# Patient Record
Sex: FEMALE | Race: WHITE | HISPANIC OR LATINO | Employment: STUDENT | URBAN - METROPOLITAN AREA
[De-identification: names, ages, dates, MRNs, and addresses within clinical notes are randomized per-mention and may not be internally consistent; named-entity substitution may affect disease eponyms.]

---

## 2020-07-07 ENCOUNTER — LAB VISIT (OUTPATIENT)
Dept: PRIMARY CARE CLINIC | Facility: OTHER | Age: 25
End: 2020-07-07
Attending: INTERNAL MEDICINE
Payer: MEDICAID

## 2020-07-07 DIAGNOSIS — Z03.818 ENCOUNTER FOR OBSERVATION FOR SUSPECTED EXPOSURE TO OTHER BIOLOGICAL AGENTS RULED OUT: ICD-10-CM

## 2020-07-07 PROCEDURE — U0003 INFECTIOUS AGENT DETECTION BY NUCLEIC ACID (DNA OR RNA); SEVERE ACUTE RESPIRATORY SYNDROME CORONAVIRUS 2 (SARS-COV-2) (CORONAVIRUS DISEASE [COVID-19]), AMPLIFIED PROBE TECHNIQUE, MAKING USE OF HIGH THROUGHPUT TECHNOLOGIES AS DESCRIBED BY CMS-2020-01-R: HCPCS

## 2020-07-10 LAB — SARS-COV-2 RNA RESP QL NAA+PROBE: NEGATIVE

## 2024-04-29 ENCOUNTER — TELEPHONE (OUTPATIENT)
Dept: OBSTETRICS AND GYNECOLOGY | Facility: CLINIC | Age: 29
End: 2024-04-29
Payer: COMMERCIAL

## 2024-04-29 NOTE — TELEPHONE ENCOUNTER
----- Message from Francisca Eng sent at 4/26/2024  3:26 PM CDT -----  Contact: pt  Type:  Sooner Appointment Request    Caller is requesting a sooner appointment.  Caller declined first available appointment listed below.  Caller will not accept being placed on the waitlist and is requesting a message be sent to doctor.  Name of Caller:NP   When is the first available appointment?  Symptoms:Annual   Would the patient rather a call back or a response via [facility]? Call   Best Call Back Number:[phone]  Additional Information:

## 2024-04-29 NOTE — TELEPHONE ENCOUNTER
----- Message from Francisca Eng sent at 4/26/2024  3:26 PM CDT -----  Contact: pt  Type:  Sooner Appointment Request    Caller is requesting a sooner appointment.  Caller declined first available appointment listed below.  Caller will not accept being placed on the waitlist and is requesting a message be sent to doctor.  Name of Caller:NP   When is the first available appointment?  Symptoms:Annual   Would the patient rather a call back or a response via [facility]? Call   Best Call Back Number:[phone]  Additional Information:

## 2024-04-30 ENCOUNTER — TELEPHONE (OUTPATIENT)
Dept: OBSTETRICS AND GYNECOLOGY | Facility: CLINIC | Age: 29
End: 2024-04-30
Payer: COMMERCIAL

## 2024-04-30 NOTE — TELEPHONE ENCOUNTER
----- Message from Evelin Solares sent at 4/29/2024  4:23 PM CDT -----  Type:  Patient Returning Call    Who Called:pt  Who Left Message for Patient:Bon Lawrence  Does the patient know what this is regarding?:returning call  Would the patient rather a call back or a response via [facility]? call  Best Call Back Number: [phone]  Additional Information:

## 2024-04-30 NOTE — TELEPHONE ENCOUNTER
Spoke with pt. Informed pt that  only does fibroid visits. Got pt scheduled wit  on 6/18 at 10am. Pt verbalized understanding

## 2025-05-09 ENCOUNTER — HOSPITAL ENCOUNTER (OUTPATIENT)
Facility: HOSPITAL | Age: 30
Discharge: HOME | End: 2025-05-10
Attending: EMERGENCY MEDICINE | Admitting: INTERNAL MEDICINE
Payer: COMMERCIAL

## 2025-05-09 DIAGNOSIS — K85.10 ACUTE BILIARY PANCREATITIS WITHOUT INFECTION OR NECROSIS: Primary | ICD-10-CM

## 2025-05-09 DIAGNOSIS — K80.20 CALCULUS OF GALLBLADDER WITHOUT CHOLECYSTITIS WITHOUT OBSTRUCTION: ICD-10-CM

## 2025-05-09 DIAGNOSIS — K80.50 CHOLEDOCHOLITHIASIS: ICD-10-CM

## 2025-05-09 DIAGNOSIS — R10.10 UPPER ABDOMINAL PAIN: ICD-10-CM

## 2025-05-09 DIAGNOSIS — R10.9 ABDOMINAL PAIN: ICD-10-CM

## 2025-05-09 DIAGNOSIS — R07.9 CHEST PAIN: ICD-10-CM

## 2025-05-09 DIAGNOSIS — K85.90 ACUTE PANCREATITIS WITHOUT INFECTION OR NECROSIS, UNSPECIFIED PANCREATITIS TYPE: ICD-10-CM

## 2025-05-09 PROBLEM — K76.0 HEPATIC STEATOSIS: Status: ACTIVE | Noted: 2025-05-09

## 2025-05-09 PROBLEM — E28.2 PCOS (POLYCYSTIC OVARIAN SYNDROME): Status: ACTIVE | Noted: 2024-06-28

## 2025-05-09 PROBLEM — Z00.00 HEALTHCARE MAINTENANCE: Status: ACTIVE | Noted: 2025-01-09

## 2025-05-09 PROBLEM — E78.5 MILD HYPERLIPIDEMIA: Status: ACTIVE | Noted: 2025-01-09

## 2025-05-09 PROBLEM — N91.2 AMENORRHEA: Status: ACTIVE | Noted: 2024-06-28

## 2025-05-09 LAB
ABSOLUTE EOSINOPHIL (OHS): 0.09 K/UL
ABSOLUTE MONOCYTE (OHS): 0.6 K/UL (ref 0.3–1)
ABSOLUTE NEUTROPHIL COUNT (OHS): 5.93 K/UL (ref 1.8–7.7)
ALBUMIN SERPL BCP-MCNC: 3.7 G/DL (ref 3.5–5.2)
ALP SERPL-CCNC: 166 UNIT/L (ref 40–150)
ALT SERPL W/O P-5'-P-CCNC: 314 UNIT/L (ref 10–44)
ANION GAP (OHS): 12 MMOL/L (ref 8–16)
AST SERPL-CCNC: 343 UNIT/L (ref 11–45)
B-HCG UR QL: NEGATIVE
BACTERIA #/AREA URNS AUTO: ABNORMAL /HPF
BASOPHILS # BLD AUTO: 0.05 K/UL
BASOPHILS NFR BLD AUTO: 0.6 %
BILIRUB DIRECT SERPL-MCNC: 0.3 MG/DL (ref 0.1–0.3)
BILIRUB SERPL-MCNC: 1.5 MG/DL (ref 0.1–1)
BILIRUB UR QL STRIP.AUTO: NEGATIVE
BUN SERPL-MCNC: 8 MG/DL (ref 6–20)
CALCIUM SERPL-MCNC: 9.5 MG/DL (ref 8.7–10.5)
CHLORIDE SERPL-SCNC: 107 MMOL/L (ref 95–110)
CHOLEST SERPL-MCNC: 176 MG/DL (ref 120–199)
CHOLEST/HDLC SERPL: 3.8 {RATIO} (ref 2–5)
CLARITY UR: ABNORMAL
CO2 SERPL-SCNC: 19 MMOL/L (ref 23–29)
COLOR UR AUTO: YELLOW
CREAT SERPL-MCNC: 0.6 MG/DL (ref 0.5–1.4)
CTP QC/QA: YES
ERYTHROCYTE [DISTWIDTH] IN BLOOD BY AUTOMATED COUNT: 14.4 % (ref 11.5–14.5)
GFR SERPLBLD CREATININE-BSD FMLA CKD-EPI: >60 ML/MIN/1.73/M2
GGT SERPL-CCNC: 367 U/L (ref 8–55)
GLUCOSE SERPL-MCNC: 141 MG/DL (ref 70–110)
GLUCOSE UR QL STRIP: NEGATIVE
HCT VFR BLD AUTO: 38.4 % (ref 37–48.5)
HDLC SERPL-MCNC: 46 MG/DL (ref 40–75)
HDLC SERPL: 26.1 % (ref 20–50)
HGB BLD-MCNC: 13.2 GM/DL (ref 12–16)
HGB UR QL STRIP: NEGATIVE
HOLD SPECIMEN: NORMAL
IMM GRANULOCYTES # BLD AUTO: 0.03 K/UL (ref 0–0.04)
IMM GRANULOCYTES NFR BLD AUTO: 0.4 % (ref 0–0.5)
KETONES UR QL STRIP: NEGATIVE
LDLC SERPL CALC-MCNC: 86.6 MG/DL (ref 63–159)
LEUKOCYTE ESTERASE UR QL STRIP: ABNORMAL
LIPASE SERPL-CCNC: 498 U/L (ref 4–60)
LYMPHOCYTES # BLD AUTO: 1.77 K/UL (ref 1–4.8)
MCH RBC QN AUTO: 28.2 PG (ref 27–31)
MCHC RBC AUTO-ENTMCNC: 34.4 G/DL (ref 32–36)
MCV RBC AUTO: 82 FL (ref 82–98)
MICROSCOPIC COMMENT: ABNORMAL
NITRITE UR QL STRIP: NEGATIVE
NONHDLC SERPL-MCNC: 130 MG/DL
NUCLEATED RBC (/100WBC) (OHS): 0 /100 WBC
PH UR STRIP: 5 [PH]
PLATELET # BLD AUTO: 219 K/UL (ref 150–450)
PMV BLD AUTO: 11.2 FL (ref 9.2–12.9)
POTASSIUM SERPL-SCNC: 3.6 MMOL/L (ref 3.5–5.1)
PROT SERPL-MCNC: 7.7 GM/DL (ref 6–8.4)
PROT UR QL STRIP: NEGATIVE
RBC # BLD AUTO: 4.68 M/UL (ref 4–5.4)
RBC #/AREA URNS AUTO: 10 /HPF (ref 0–4)
RELATIVE EOSINOPHIL (OHS): 1.1 %
RELATIVE LYMPHOCYTE (OHS): 20.9 % (ref 18–48)
RELATIVE MONOCYTE (OHS): 7.1 % (ref 4–15)
RELATIVE NEUTROPHIL (OHS): 69.9 % (ref 38–73)
SODIUM SERPL-SCNC: 138 MMOL/L (ref 136–145)
SP GR UR STRIP: 1.01
SQUAMOUS #/AREA URNS AUTO: 7 /HPF
T4 FREE SERPL-MCNC: 1.02 NG/DL (ref 0.71–1.51)
T4 FREE SERPL-MCNC: 1.02 NG/DL (ref 0.71–1.51)
TRIGL SERPL-MCNC: 217 MG/DL (ref 30–150)
TRIGL SERPL-MCNC: 218 MG/DL (ref 30–150)
TSH SERPL-ACNC: 0.95 UIU/ML (ref 0.4–4)
UROBILINOGEN UR STRIP-ACNC: NEGATIVE EU/DL
WBC # BLD AUTO: 8.47 K/UL (ref 3.9–12.7)
WBC #/AREA URNS AUTO: 5 /HPF (ref 0–5)

## 2025-05-09 PROCEDURE — G0378 HOSPITAL OBSERVATION PER HR: HCPCS

## 2025-05-09 PROCEDURE — 84478 ASSAY OF TRIGLYCERIDES: CPT

## 2025-05-09 PROCEDURE — 81003 URINALYSIS AUTO W/O SCOPE: CPT | Performed by: NURSE PRACTITIONER

## 2025-05-09 PROCEDURE — 96361 HYDRATE IV INFUSION ADD-ON: CPT

## 2025-05-09 PROCEDURE — 85025 COMPLETE CBC W/AUTO DIFF WBC: CPT | Performed by: NURSE PRACTITIONER

## 2025-05-09 PROCEDURE — 96372 THER/PROPH/DIAG INJ SC/IM: CPT

## 2025-05-09 PROCEDURE — 81025 URINE PREGNANCY TEST: CPT | Performed by: NURSE PRACTITIONER

## 2025-05-09 PROCEDURE — 99285 EMERGENCY DEPT VISIT HI MDM: CPT | Mod: 25

## 2025-05-09 PROCEDURE — 63600175 PHARM REV CODE 636 W HCPCS: Mod: JZ,TB | Performed by: NURSE PRACTITIONER

## 2025-05-09 PROCEDURE — 82248 BILIRUBIN DIRECT: CPT

## 2025-05-09 PROCEDURE — 93005 ELECTROCARDIOGRAM TRACING: CPT

## 2025-05-09 PROCEDURE — 84443 ASSAY THYROID STIM HORMONE: CPT

## 2025-05-09 PROCEDURE — 96374 THER/PROPH/DIAG INJ IV PUSH: CPT

## 2025-05-09 PROCEDURE — 63600175 PHARM REV CODE 636 W HCPCS

## 2025-05-09 PROCEDURE — 83690 ASSAY OF LIPASE: CPT | Performed by: NURSE PRACTITIONER

## 2025-05-09 PROCEDURE — 82040 ASSAY OF SERUM ALBUMIN: CPT | Performed by: NURSE PRACTITIONER

## 2025-05-09 PROCEDURE — 82977 ASSAY OF GGT: CPT

## 2025-05-09 PROCEDURE — 84478 ASSAY OF TRIGLYCERIDES: CPT | Performed by: NURSE PRACTITIONER

## 2025-05-09 RX ORDER — METHOCARBAMOL 500 MG/1
500 TABLET, FILM COATED ORAL 3 TIMES DAILY
COMMUNITY
Start: 2024-10-23 | End: 2025-05-09

## 2025-05-09 RX ORDER — HYDROMORPHONE HYDROCHLORIDE 1 MG/ML
0.5 INJECTION, SOLUTION INTRAMUSCULAR; INTRAVENOUS; SUBCUTANEOUS EVERY 6 HOURS PRN
Status: DISCONTINUED | OUTPATIENT
Start: 2025-05-09 | End: 2025-05-10 | Stop reason: HOSPADM

## 2025-05-09 RX ORDER — SODIUM CHLORIDE 0.9 % (FLUSH) 0.9 %
10 SYRINGE (ML) INJECTION EVERY 12 HOURS PRN
Status: DISCONTINUED | OUTPATIENT
Start: 2025-05-09 | End: 2025-05-10 | Stop reason: HOSPADM

## 2025-05-09 RX ORDER — NALOXONE HCL 0.4 MG/ML
0.02 VIAL (ML) INJECTION
Status: DISCONTINUED | OUTPATIENT
Start: 2025-05-09 | End: 2025-05-10 | Stop reason: HOSPADM

## 2025-05-09 RX ORDER — IBUPROFEN 200 MG
24 TABLET ORAL
Status: DISCONTINUED | OUTPATIENT
Start: 2025-05-09 | End: 2025-05-10

## 2025-05-09 RX ORDER — ENOXAPARIN SODIUM 100 MG/ML
40 INJECTION SUBCUTANEOUS EVERY 24 HOURS
Status: DISCONTINUED | OUTPATIENT
Start: 2025-05-09 | End: 2025-05-10 | Stop reason: HOSPADM

## 2025-05-09 RX ORDER — GLUCAGON 1 MG
1 KIT INJECTION
Status: DISCONTINUED | OUTPATIENT
Start: 2025-05-09 | End: 2025-05-10

## 2025-05-09 RX ORDER — MEDROXYPROGESTERONE ACETATE 10 MG/1
10 TABLET ORAL
COMMUNITY
Start: 2024-06-17 | End: 2025-05-09

## 2025-05-09 RX ORDER — KETOROLAC TROMETHAMINE 30 MG/ML
15 INJECTION, SOLUTION INTRAMUSCULAR; INTRAVENOUS
Status: COMPLETED | OUTPATIENT
Start: 2025-05-09 | End: 2025-05-09

## 2025-05-09 RX ORDER — SODIUM CHLORIDE, SODIUM LACTATE, POTASSIUM CHLORIDE, CALCIUM CHLORIDE 600; 310; 30; 20 MG/100ML; MG/100ML; MG/100ML; MG/100ML
INJECTION, SOLUTION INTRAVENOUS CONTINUOUS
Status: ACTIVE | OUTPATIENT
Start: 2025-05-09 | End: 2025-05-10

## 2025-05-09 RX ORDER — ONDANSETRON HYDROCHLORIDE 2 MG/ML
4 INJECTION, SOLUTION INTRAVENOUS EVERY 6 HOURS PRN
Status: DISCONTINUED | OUTPATIENT
Start: 2025-05-09 | End: 2025-05-10 | Stop reason: HOSPADM

## 2025-05-09 RX ORDER — NORGESTIMATE AND ETHINYL ESTRADIOL 0.25-0.035
1 KIT ORAL DAILY
COMMUNITY

## 2025-05-09 RX ADMIN — ENOXAPARIN SODIUM 40 MG: 40 INJECTION SUBCUTANEOUS at 06:05

## 2025-05-09 RX ADMIN — KETOROLAC TROMETHAMINE 15 MG: 30 INJECTION, SOLUTION INTRAMUSCULAR; INTRAVENOUS at 12:05

## 2025-05-09 RX ADMIN — SODIUM CHLORIDE, POTASSIUM CHLORIDE, SODIUM LACTATE AND CALCIUM CHLORIDE: 600; 310; 30; 20 INJECTION, SOLUTION INTRAVENOUS at 10:05

## 2025-05-09 NOTE — MEDICAL/APP STUDENT
[facility] Medicine H&P Note     Admitting Team: [facility] Hospitalist Team B  Attending Physician: Keyshawn Anaya MD  Medical Student: Natalya Gutierrez    Date of Admit: 5/9/2025    Chief Complaint     Back Pain (Patient reports lumbar back pain x2 days. She reports nausea yesterday. Patient denies any trauma/injury to back. She denies any pain or burning upon urination. Patient reports taking muscle relaxer and Advil for pain with little relief. Patient reports hx of back pain. )  RUQ and mid back pain    Subjective:      History of Present Illness:  Blanca Acosta is a 29 y.o. female with a PMHx of PCOS and mild hyperlipidemia. The patient presented on 5/9/2025 for evaluation of 2 days of recurrent mid back pain and 1 day of RUQ/epigastric pain. States a stabbing mid back pain awoke her from sleep 2 nights ago and self resolved within a few minutes. Reports onset of nausea after lunch yesterday for few hours, later accompanied by a new, dull, pressure-like pain in the RUQ/epigastric region lasting about 30 minutes. Patient states the abdominal pain was mildly relieved by an antacid and Sprite before self-resolving. Reports decreased appetite and recurrence of the abdominal pain this morning immediately after breakfast (toast and eggs) until she arrived at the ED around 10:30. She says yesterday was the first time she has had the abdominal pain, but that she has been having the stabbing back pain every few weeks since July 2024. States she previously saw a PCP and an orthopedist for it, both of whom were treating her for muscular pain due to negative x-rays. States the orthopedist gave her a muscle relaxant for the pain, which helped moderately the first few times she took it before it stopped working, and reports NSAIDs failed to control the pain. She says her fingers get cold and her mother tells her she looks pale during these episodes.  Reports diet rich in rice, beans, and cheese, with minimal fried food, and denies  association of pain with any specific food. Denies fever, chills, constipation, diarrhea, dysuria, and vomiting.      Past Medical History:  History reviewed. No pertinent past medical history.  Polycystic Ovarian Disease  Mild Hyperlipidemia    Past Surgical History:  History reviewed. No pertinent surgical history.  None.    Social History:  Tobacco: Denies  Alcohol: every other week, minimal intake  Drugs: Denies    Family History:  No family history on file.  Mother: DM, HTN, cholecystectomy  Father: HTN, cholecystectomy    Home Medications:  Estarylla (ethinyl estradiol/norgestimate)    Allergies:  Review of patient's allergies indicates:  No Known Allergies    Review of Systems:  Review of Systems   Constitutional:  Negative for chills and fever.   Respiratory:  Positive for shortness of breath (during episodes of back pain).    Cardiovascular:  Negative for chest pain.   Gastrointestinal:  Positive for abdominal pain (RUQ/epigastric) and nausea. Negative for constipation, diarrhea, heartburn and vomiting.   Genitourinary:  Negative for dysuria.   Neurological:  Negative for headaches.       Health Care Maintenance :   Primary Care Physician: Mick العلي DO     Immunizations:   Immunization History   Administered Date(s) Administered    COVID-19, MRNA, LN-S, PF (Pfizer) (Purple Cap) 01/20/2021, 02/10/2021, 12/14/2021    DTaP 02/12/1996, 04/11/1996, 06/14/1996, 06/17/1997, 08/10/2000    HIB 02/12/1996, 04/11/1996, 06/14/1996, 03/11/1997    HPV Quadrivalent 09/10/2007, 11/16/2007, 03/21/2008    Hepatitis A, Pediatric/Adolescent, 2 Dose 08/28/1998    Hepatitis B, Pediatric/Adolescent 1995, 02/12/1996, 06/14/1996    IPV 02/12/1996, 04/11/1996, 06/14/1996, 08/10/2000    Influenza - Intranasal 11/20/2012    Influenza - Trivalent (PED) 11/16/2007, 11/17/2009, 11/09/2010    Influenza - Trivalent - Afluria, Fluzone MDV 10/18/2014, 09/30/2017    MMR 03/11/1997, 05/13/2000    Meningococcal Conjugate  "(MCV4O) 1 Vial Dose(10yr-55yr) 2012    Meningococcal Conjugate (MCV4P) 09/10/2007    Td (ADULT) 2018    Td - PF (ADULT) 2018    Tdap 09/10/2007    Varicella 1996, 09/10/2007        Cancer Screening:  PAP: ***  MMG: N/A  Colonoscopy: N/A    Objective:   Last 24 Hour Vital Signs:  BP  Min: 147/91  Max: 147/91  Temp  Av.2 °F (36.8 °C)  Min: 98.2 °F (36.8 °C)  Max: 98.2 °F (36.8 °C)  Pulse  Av  Min: 76  Max: 76  Resp  Av  Min: 16  Max: 16  SpO2  Av %  Min: 99 %  Max: 99 %  Height  Av' 2" (157.5 cm)  Min: 5' 2" (157.5 cm)  Max: 5' 2" (157.5 cm)  Weight  Av.2 kg (190 lb)  Min: 86.2 kg (190 lb)  Max: 86.2 kg (190 lb)  Body mass index is 34.75 kg/m².  No intake/output data recorded.    Physical Examination:  Physical Exam  Vitals and nursing note reviewed.   Constitutional:       General: She is not in acute distress.     Appearance: She is not ill-appearing.   HENT:      Head: Normocephalic and atraumatic.      Nose: Nose normal.      Mouth/Throat:      Mouth: Mucous membranes are moist.      Pharynx: Oropharynx is clear. No posterior oropharyngeal erythema.   Eyes:      General: No scleral icterus.     Extraocular Movements: Extraocular movements intact.      Conjunctiva/sclera: Conjunctivae normal.   Cardiovascular:      Rate and Rhythm: Normal rate and regular rhythm.      Heart sounds: Normal heart sounds.   Pulmonary:      Effort: Pulmonary effort is normal.      Breath sounds: Normal breath sounds. No wheezing or rales.   Chest:      Chest wall: No tenderness.   Abdominal:      General: Abdomen is flat. There is no distension.      Palpations: Abdomen is soft. There is no mass.      Tenderness: There is abdominal tenderness (RUQ). There is no guarding or rebound.   Musculoskeletal:      Thoracic back: Normal.      Lumbar back: Tenderness (R mid back) present.   Skin:     General: Skin is warm.      Capillary Refill: Capillary refill takes less than 2 seconds.      " "Coloration: Skin is not jaundiced or pale.      Findings: No erythema.   Neurological:      Mental Status: She is alert and oriented to person, place, and time.   Psychiatric:         Mood and Affect: Mood normal.         Behavior: Behavior normal.          Laboratory:  Most Recent Data:  CBC:   Lab Results   Component Value Date    WBC 8.47 05/09/2025    HGB 13.2 05/09/2025    HCT 38.4 05/09/2025     05/09/2025    MCV 82 05/09/2025    RDW 14.4 05/09/2025     WBC Differential: 69.9 % N, 0.4 % Bands, 1.77 % L, 7.1 % M, 0.09 % Eo, 0.05 % Baso      BMP:   Lab Results   Component Value Date     05/09/2025    K 3.6 05/09/2025     05/09/2025    CO2 19 (L) 05/09/2025    BUN 8 05/09/2025    CREATININE 0.6 05/09/2025     (H) 05/09/2025    CALCIUM 9.5 05/09/2025     LFTs:   Lab Results   Component Value Date    PROT 7.7 05/09/2025    ALBUMIN 3.7 05/09/2025    BILITOT 1.5 (H) 05/09/2025     (H) 05/09/2025    ALKPHOS 166 (H) 05/09/2025     (H) 05/09/2025         Coags: No results found for: "INR", "PROTIME", "PTT"  FLP:   Lab Results   Component Value Date    TRIG 218 (H) 05/09/2025     DM:   Lab Results   Component Value Date    HGBA1C 5.6 06/17/2024    GLUF 84 01/08/2010    CREATININE 0.6 05/09/2025     Thyroid:   Lab Results   Component Value Date    TSH 2.57 01/08/2010     Anemia: No results found for: "IRON", "TIBC", "FERRITIN", "IDSACYPJ92", "FOLATE"  Cardiac: No results found for: "TROPONINI", "CKTOTAL", "CKMB", "BNP"  Urinalysis:   Lab Results   Component Value Date    COLORU Yellow 05/09/2025    SPECGRAV 1.015 05/09/2025    NITRITE Negative 05/09/2025    KETONESU NEG 08/07/2012    UROBILINOGEN Negative 05/09/2025    WBCUA 5 05/09/2025       Microbiology Data:   N/A    Radiology:  Imaging Results              X-Ray Chest 1 View (Final result)  Result time 05/09/25 18:00:28      Final result by Aristides Allison DO (05/09/25 18:00:28)                   Impression:      No acute " abnormality.      Electronically signed by: Aristides Allison  Date:    05/09/2025  Time:    18:00               Narrative:    EXAMINATION:  XR CHEST 1 VIEW    CLINICAL HISTORY:  abdominal pain;    TECHNIQUE:  Single frontal view of the chest was performed.    COMPARISON:  None    FINDINGS:  The lungs are well expanded and clear. No focal opacities are seen. The pleural spaces are clear. The cardiac silhouette is unremarkable. The visualized osseous structures are unremarkable.                                       MRI MRCP Abdomen WO Contrast 3D WO Independent WS XPD (Final result)  Result time 05/09/25 17:28:27      Final result by Aristides Allison DO (05/09/25 17:28:27)                   Impression:      Acute, interstitial pancreatitis, recommend correlation with lipase.  No peripancreatic fluid collection.    Cholelithiasis without evidence of acute cholecystitis.      Electronically signed by: Aristides Allison  Date:    05/09/2025  Time:    17:28               Narrative:    EXAMINATION:  MRI MRCP ABDOMEN WO CONTRAST 3D WO INDEPENDENT WS XPD    CLINICAL HISTORY:  Cholelithiasis;    TECHNIQUE:  Multiplanar, multisequence magnetic resonance images of the liver and upper abdomen without intravenous contrast. Additionally 2D and 3D MRCP sequences are performed as well as MIP images.    COMPARISON:  Ultrasound from 05/09/2025.    FINDINGS:  Pulmonary Bases: No gross abnormality.    Liver: The liver is normal in size and demonstrates normal signal.  No evidence of fat or iron deposition.  No focal lesion.    Bile Ducts: No intra or extrahepatic biliary ductal dilation.  There is no evidence of a biliary filling defect.    Gallbladder: There are gallstones.  There is no gallbladder wall thickening or pericholecystic fluid or inflammatory change.    Pancreas: There is minimal pancreatic and peripancreatic edema, compatible with acute, interstitial pancreatitis.  There is no peripancreatic fluid collection.  There is no  pancreatic ductal dilation.    Spleen: Normal size without focal lesion.    Gastrointestinal tract: No bowel wall thickening or obstruction.    Mesentery and retroperitoneum: No ascites, focal fluid collection, or free air.    Lymph nodes: No evidence of lymphadenopathy.    Adrenal Glands: Unremarkable.    Kidneys: Normal without focal lesion or hydronephrosis.    Aorta and Abdominal Vasculature: No aneurysm.    Body wall: Unremarkable.    Musculoskeletal: Normal marrow signal.                                       US Abdomen Limited (Gallbladder) (Final result)  Result time 05/09/25 12:17:59      Final result by Ryan Lundy MD (05/09/25 12:17:59)                   Impression:      Cholelithiasis/sludge, no secondary findings to suggest acute cholecystitis.    Hepatic parenchymal findings may reflect steatosis or other infiltrative process, correlation with LFTs advised.      Electronically signed by: Ryan uLndy MD  Date:    05/09/2025  Time:    12:17               Narrative:    EXAMINATION:  US ABDOMEN LIMITED    CLINICAL HISTORY:  Upper abdominal pain, unspecified    TECHNIQUE:  Limited ultrasound of the right upper quadrant of the abdomen (including pancreas, liver, gallbladder, common bile duct, and spleen) was performed.    COMPARISON:  None.    FINDINGS:  The visualized portions of the pancreas are unremarkable.  There is cholelithiasis/sludge.  The gallbladder is relatively decompressed.  No gallbladder wall thickening or pericholecystic fluid.  No gallbladder wall hyperemia.  Sonographic Morocho sign is negative.  The common duct is not dilated measuring 4 mm.  The a [place] parenchyma is heterogeneous and somewhat echogenic.  The visualized portions of the right kidney are unremarkable.  No ascites.                                        Other Results:  EKG (my interpretation): n/a      Assessment:     Blanca Acosta is a 29 y.o. female with a PMHx of PCOS and hyperlipidemia presenting with 2  days of acute back pain and 1 day of acute RUQ pain associated with nausea.  Given recurrent stabbing back pain that resolves in minutes, unaffected by NSAIDs, with orthopedics and PCP previously ruling out a skeletal source, patient likely has chronic cholelithiasis. Now, with acute presentation per clinical presentation, ultrasound, elevated LFTs, and elevated lipase.     Plan:     Acute on Chronic Cholelithiasis  - U/S with cholelithiasis and sludge; common bile duct not dilated  - Elevated LFTs: , , , total bilirubin 1.5, Gamma Glutamyl Transferase 367      Hypertriglyceridemia  - Triglycerides: 218  - Elevated Lipase: 498      PPx: ***  Diet: ***  Code: ***    Disposition: ***    Natalya Gutierrez MS-3  [employer] [facility] [employer]      [facility] Medicine Hospitalist Pager numbers:   [facility] Hospitalist Medicine Team A (Fabricio/August): [phone]  [facility] Hospitalist Medicine Team B (Jaclyn/Shawn):  [phone]

## 2025-05-09 NOTE — PHARMACY MED REC
"      [facility]           Pharmacy  Admission Medication History     The home medication history was taken by Lorena Vigil.      Medication history obtained from Medications listed below were obtained from: Patient/family.    Based on information gathered for medication list, you may go to "Admission" then "Reconcile Home Medications" tabs to review and/or act upon those items.     The home medication list has been updated by the Pharmacy department.   Please read ALL comments highlighted in yellow.   Please address this information as you see fit.    Feel free to contact us if you have any questions or require assistance.    The medications listed below were removed from the home medication list.  Please reorder if appropriate:    Patient reports NOT TAKING the following medication(s):  Methocarbamol 500 mg tab  Medroxyprogesterone 10 mg tab    No current facility-administered medications on file prior to encounter.     Current Outpatient Medications on File Prior to Encounter   Medication Sig Dispense Refill    ESTARYLLA 0.25-0.035 mg per tablet Take 1 tablet by mouth once daily.         Please address this information as you see fit.  Feel free to contact us if you have any questions or require assistance.    Lorena Vigil  [phone]            .          "

## 2025-05-09 NOTE — PLAN OF CARE
VIRTUAL NURSE: Permission received per patient to turn camera to view patient. VIP model explained; patient informed this VN will be working with bedside nurse and the rest of the care team. Plan of care reviewed with patient.  Educated patient on VTE, fall risk and fall risk precautions in place. Call light within reach, side rails up x2. Admission questions completed. Patient educated on fall precautions. Patient verbalized understanding

## 2025-05-09 NOTE — ED PROVIDER NOTES
Encounter Date: 5/9/2025       History     Chief Complaint   Patient presents with    Back Pain     Patient reports lumbar back pain x2 days. She reports nausea yesterday. Patient denies any trauma/injury to back. She denies any pain or burning upon urination. Patient reports taking muscle relaxer and Advil for pain with little relief. Patient reports hx of back pain.      29 yr old female presents to the ER with reports of mid back pain. Pt states she has been seen per ortho and others with no answer to pain. After further eval, pt states she has been having nausea, especially after eating, and right upper abd pain. Pt states she has been having this pain for over a year on and off. Denies vomiting or diarrhea. No dysuria. No chest pain or sob. PMH of PCOS.     The history is provided by a relative and the patient. No  was used.     Review of patient's allergies indicates:  No Known Allergies  History reviewed. No pertinent past medical history.  History reviewed. No pertinent surgical history.  No family history on file.  Social History[1]  Review of Systems   Gastrointestinal:  Positive for abdominal pain and nausea.       Physical Exam     Initial Vitals [05/09/25 1045]   BP Pulse Resp Temp SpO2   (!) 147/91 76 16 98.2 °F (36.8 °C) 99 %      MAP       --         Physical Exam    Constitutional: She appears well-developed and well-nourished.   HENT:   Head: Normocephalic.   Right Ear: Hearing normal.   Left Ear: Hearing normal.   Nose: Nose normal. Mouth/Throat: Oropharynx is clear and moist.   Eyes: Lids are normal.   Neck:   Normal range of motion.  Cardiovascular:  Normal rate.           Pulmonary/Chest: Breath sounds normal. No respiratory distress. She has no wheezes. She has no rhonchi.   Abdominal: Abdomen is soft. There is abdominal tenderness in the right upper quadrant. There is positive Morocho's sign.   Musculoskeletal:         General: Normal range of motion.      Cervical back:  Normal range of motion. No rigidity.     Neurological: She is alert and oriented to person, place, and time.   Skin: Skin is warm and dry. No rash noted.   Psychiatric: She has a normal mood and affect. Her behavior is normal. Judgment and thought content normal.         ED Course   Procedures  Labs Reviewed   COMPREHENSIVE METABOLIC PANEL - Abnormal       Result Value    Sodium 138      Potassium 3.6      Chloride 107      CO2 19 (*)     Glucose 141 (*)     BUN 8      Creatinine 0.6      Calcium 9.5      Protein Total 7.7      Albumin 3.7      Bilirubin Total 1.5 (*)      (*)      (*)      (*)     Anion Gap 12      eGFR >60     LIPASE - Abnormal    Lipase Level 498 (*)    URINALYSIS, REFLEX TO URINE CULTURE - Abnormal    Color, UA Yellow      Appearance, UA Hazy (*)     pH, UA 5.0      Spec Grav UA 1.015      Protein, UA Negative      Glucose, UA Negative      Ketones, UA Negative      Bilirubin, UA Negative      Blood, UA Negative      Nitrites, UA Negative      Urobilinogen, UA Negative      Leukocyte Esterase, UA 2+ (*)    URINALYSIS MICROSCOPIC - Abnormal    RBC, UA 10 (*)     WBC, UA 5      Bacteria, UA Rare      Squamous Epithelial Cells, UA 7      Microscopic Comment       CBC WITH DIFFERENTIAL - Normal    WBC 8.47      RBC 4.68      HGB 13.2      HCT 38.4      MCV 82      MCH 28.2      MCHC 34.4      RDW 14.4      Platelet Count 219      MPV 11.2      Nucleated RBC 0      Neut % 69.9      Lymph % 20.9      Mono % 7.1      Eos % 1.1      Basophil % 0.6      Imm Grans % 0.4      Neut # 5.93      Lymph # 1.77      Mono # 0.60      Eos # 0.09      Baso # 0.05      Imm Grans # 0.03     CBC W/ AUTO DIFFERENTIAL    Narrative:     The following orders were created for panel order CBC auto differential.  Procedure                               Abnormality         Status                     ---------                               -----------         ------                     CBC with  Differential[[id number]]         Normal              Final result                 Please view results for these tests on the individual orders.   EXTRA TUBES    Narrative:     The following orders were created for panel order EXTRA TUBES.  Procedure                               Abnormality         Status                     ---------                               -----------         ------                     Light Green Top Hold[[id number]]                              Final result                 Please view results for these tests on the individual orders.   LIGHT GREEN TOP HOLD    Extra Tube Hold for add-ons.     GREY TOP URINE HOLD    Extra Tube Hold for add-ons.     TRIGLYCERIDES   POCT URINE PREGNANCY    POC Preg Test, Ur Negative       Acceptable Yes            Imaging Results              US Abdomen Limited (Gallbladder) (Final result)  Result time 05/09/25 12:17:59      Final result by Ryan Lundy MD (05/09/25 12:17:59)                   Impression:      Cholelithiasis/sludge, no secondary findings to suggest acute cholecystitis.    Hepatic parenchymal findings may reflect steatosis or other infiltrative process, correlation with LFTs advised.      Electronically signed by: Ryan Lundy MD  Date:    05/09/2025  Time:    12:17               Narrative:    EXAMINATION:  US ABDOMEN LIMITED    CLINICAL HISTORY:  Upper abdominal pain, unspecified    TECHNIQUE:  Limited ultrasound of the right upper quadrant of the abdomen (including pancreas, liver, gallbladder, common bile duct, and spleen) was performed.    COMPARISON:  None.    FINDINGS:  The visualized portions of the pancreas are unremarkable.  There is cholelithiasis/sludge.  The gallbladder is relatively decompressed.  No gallbladder wall thickening or pericholecystic fluid.  No gallbladder wall hyperemia.  Sonographic Morocho sign is negative.  The common duct is not dilated measuring 4 mm.  The a Paddock parenchyma is heterogeneous  and somewhat echogenic.  The visualized portions of the right kidney are unremarkable.  No ascites.                                       Medications   ketorolac injection 15 mg (15 mg Intravenous Given 5/9/25 1213)     Medical Decision Making  Differential Diagnosis includes, but is not limited to:  perforated viscous, SBO/volvulus, incarcerated/strangulated hernia, intussusception, ileus, appendicitis, cholecystitis, cholangitis, diverticulitis, esophagitis, hepatitis, nephrolithiasis, pancreatitis, gastroenteritis, colitis, IBD/IBS, biliary colic, GERD, PUD, constipation, UTI/pyelonephritis,  disorder.       Amount and/or Complexity of Data Reviewed  Labs: ordered. Decision-making details documented in ED Course.  Radiology: ordered.    Risk  Prescription drug management.  Decision regarding hospitalization.               ED Course as of 05/09/25 1540   Fri May 09, 2025   1153 POCT urine pregnancy [DT]   1235 FINDINGS:  The visualized portions of the pancreas are unremarkable.  There is cholelithiasis/sludge.  The gallbladder is relatively decompressed.  No gallbladder wall thickening or pericholecystic fluid.  No gallbladder wall hyperemia.  Sonographic Morocho sign is negative.  The common duct is not dilated measuring 4 mm.  The a Paddock parenchyma is heterogeneous and somewhat echogenic.  The visualized portions of the right kidney are unremarkable.  No ascites.     Impression:     Cholelithiasis/sludge, no secondary findings to suggest acute cholecystitis.     Hepatic parenchymal findings may reflect steatosis or other infiltrative process, correlation with LFTs advised.   [DT]   1256 Urinalysis, Reflex to Urine Culture Urine, Clean Catch(!) [DT]   1256 Urinalysis Microscopic(!) [DT]   1256 CBC auto differential [DT]   1435 Comprehensive metabolic panel(!) [DT]   1436 On the line with [facility]. States to add the MRCP without and she will see patient today.  [KG]   1441 On the line with [facility] concerning  need for admission. Accepts with no other recs.  [KG]   1455 Lipase(!) [DT]   1512 PT notified to not eat or drink anything till MRCP or cleared per GI. Reviewed ultrasound and all labs.  [DT]      ED Course User Index  [DT] Evelina Woodard, ALEXANDER  [KG] Justa Trevino PA-C                           Clinical Impression:  Final diagnoses:  [R10.10] Upper abdominal pain  [K80.50] Choledocholithiasis (Primary)          ED Disposition Condition    Observation                   [1]   Social History  Tobacco Use    Smoking status: Unknown        Evelina Woodard NP  05/09/25 0456

## 2025-05-09 NOTE — ED NOTES
"Pt reports mid to upper back pain that started 2 nights ago. Pt states that pain was sharp and woke her from sleep and pain lasted only a "few minutes". Pt states that she has had lingering pain since that time. Pt states that after eating today, she began with sharp pain again while at work and left work due to pain. Pt states she suffered with nausea all day yesterday. Pt also complaining of RUQ discomfort.  "

## 2025-05-09 NOTE — ED NOTES
Received call from [facility]. Answered questions and reports they will send for her in 20 minutes.

## 2025-05-10 VITALS
SYSTOLIC BLOOD PRESSURE: 133 MMHG | DIASTOLIC BLOOD PRESSURE: 83 MMHG | TEMPERATURE: 98 F | HEART RATE: 59 BPM | WEIGHT: 190.94 LBS | BODY MASS INDEX: 35.14 KG/M2 | HEIGHT: 62 IN | RESPIRATION RATE: 16 BRPM | OXYGEN SATURATION: 99 %

## 2025-05-10 PROBLEM — K85.10 ACUTE BILIARY PANCREATITIS WITHOUT INFECTION OR NECROSIS: Status: ACTIVE | Noted: 2025-05-10

## 2025-05-10 PROBLEM — K85.10 ACUTE BILIARY PANCREATITIS WITHOUT INFECTION OR NECROSIS: Status: RESOLVED | Noted: 2025-05-10 | Resolved: 2025-05-10

## 2025-05-10 LAB
ALBUMIN SERPL BCP-MCNC: 3.4 G/DL (ref 3.5–5.2)
ALP SERPL-CCNC: 138 UNIT/L (ref 40–150)
ALT SERPL W/O P-5'-P-CCNC: 226 UNIT/L (ref 10–44)
ANION GAP (OHS): 8 MMOL/L (ref 8–16)
AST SERPL-CCNC: 124 UNIT/L (ref 11–45)
BILIRUB SERPL-MCNC: 1 MG/DL (ref 0.1–1)
BUN SERPL-MCNC: 9 MG/DL (ref 6–20)
CALCIUM SERPL-MCNC: 9.4 MG/DL (ref 8.7–10.5)
CHLORIDE SERPL-SCNC: 110 MMOL/L (ref 95–110)
CO2 SERPL-SCNC: 24 MMOL/L (ref 23–29)
CREAT SERPL-MCNC: 0.6 MG/DL (ref 0.5–1.4)
EAG (OHS): 108 MG/DL (ref 68–131)
GFR SERPLBLD CREATININE-BSD FMLA CKD-EPI: >60 ML/MIN/1.73/M2
GLUCOSE SERPL-MCNC: 79 MG/DL (ref 70–110)
HBA1C MFR BLD: 5.4 % (ref 4–5.6)
MAGNESIUM SERPL-MCNC: 1.9 MG/DL (ref 1.6–2.6)
PHOSPHATE SERPL-MCNC: 4 MG/DL (ref 2.7–4.5)
POTASSIUM SERPL-SCNC: 4.2 MMOL/L (ref 3.5–5.1)
PROT SERPL-MCNC: 7.1 GM/DL (ref 6–8.4)
SODIUM SERPL-SCNC: 142 MMOL/L (ref 136–145)

## 2025-05-10 PROCEDURE — 84100 ASSAY OF PHOSPHORUS: CPT

## 2025-05-10 PROCEDURE — 80053 COMPREHEN METABOLIC PANEL: CPT

## 2025-05-10 PROCEDURE — 96361 HYDRATE IV INFUSION ADD-ON: CPT

## 2025-05-10 PROCEDURE — G0378 HOSPITAL OBSERVATION PER HR: HCPCS

## 2025-05-10 PROCEDURE — 83735 ASSAY OF MAGNESIUM: CPT

## 2025-05-10 PROCEDURE — 83036 HEMOGLOBIN GLYCOSYLATED A1C: CPT

## 2025-05-10 PROCEDURE — 36415 COLL VENOUS BLD VENIPUNCTURE: CPT

## 2025-05-10 RX ORDER — ONDANSETRON 4 MG/1
4 TABLET, ORALLY DISINTEGRATING ORAL EVERY 8 HOURS PRN
Qty: 7 TABLET | Refills: 0 | Status: SHIPPED | OUTPATIENT
Start: 2025-05-10 | End: 2025-05-17

## 2025-05-10 NOTE — H&P
[facility] Medicine H&P Note     Admitting Team: [facility] Hospitalist Team B  Attending Physician: Keyshawn Anaya MD  Resident: Chas Howard MD  Intern: Ryan Hassan DO     Date of Admit: 5/9/2025    Chief Complaint     Back pain for 2 days     Subjective:      History of Present Illness:  Blanca Acosta is a 29 y.o. female with a PMHx of PCOS and mild hyperlipidemia. The patient presented on 5/9/2025 for evaluation of 2 days of recurrent mid back pain and 1 day of RUQ/epigastric pain. States a stabbing mid back pain awoke her from sleep 2 nights ago and self resolved within a few minutes. Reports onset of nausea after lunch yesterday for few hours, later accompanied by a new, dull, pressure-like pain in the RUQ/epigastric region lasting about 30 minutes. Patient states the abdominal pain was mildly relieved by an antacid and Sprite before self-resolving. Reports decreased appetite and recurrence of the abdominal pain this morning immediately after breakfast (toast and eggs) until she arrived at the ED around 10:30. She says yesterday was the first time she has had the abdominal pain, but that she has been having the stabbing back pain every few weeks since July 2024. States she previously saw a PCP and an orthopedist for it, both of whom were treating her for muscular pain due to negative x-rays. States the orthopedist gave her a muscle relaxant for the pain, which helped moderately the first few times she took it before it stopped working, and reports NSAIDs failed to control the pain. She says her fingers get cold and her mother tells her she looks pale during these episodes.  Reports diet rich in rice, beans, and cheese, with minimal fried food, and denies association of pain with any specific food. Denies fever, chills, constipation, diarrhea, dysuria, and vomiting.    Past Medical History:  - PCOS  - HLD    Past Surgical History:  - No prior surgery     Social History:  Tobacco: None  Alcohol: Occasional  "  Drugs: None    Family History:  - No pertinent family history     Home Medications:  - Estarylla (ethinyl estradiol/norgestimate)     Allergies:  Review of patient's allergies indicates:  No Known Allergies    Review of Systems:  Review of Systems   Gastrointestinal:  Positive for abdominal pain and nausea.   Musculoskeletal:  Positive for back pain.   All other systems reviewed and are negative.      Health Care Maintenance :   Primary Care Physician: Mick العلي DO     Immunizations:   Immunization History   Administered Date(s) Administered    COVID-19, MRNA, LN-S, PF (Pfizer) (Purple Cap) 2021, 02/10/2021, 2021    DTaP 1996, 1996, 1996, 1997, 08/10/2000    HIB 1996, 1996, 1996, 1997    HPV Quadrivalent 09/10/2007, 2007, 2008    Hepatitis A, Pediatric/Adolescent, 2 Dose 1998    Hepatitis B, Pediatric/Adolescent 1995, 1996, 1996    IPV 1996, 1996, 1996, 08/10/2000    Influenza - Intranasal 2012    Influenza - Trivalent (PED) 2007, 2009, 2010    Influenza - Trivalent - Afluria, Fluzone MDV 10/18/2014, 2017    MMR 1997, 2000    Meningococcal Conjugate (MCV4O) 1 Vial Dose(10yr-55yr) 2012    Meningococcal Conjugate (MCV4P) 09/10/2007    Td (ADULT) 2018    Td - PF (ADULT) 2018    Tdap 09/10/2007    Varicella 1996, 09/10/2007       Objective:   Last 24 Hour Vital Signs:  BP  Min: 134/83  Max: 147/91  Temp  Av.4 °F (36.9 °C)  Min: 98.2 °F (36.8 °C)  Max: 98.5 °F (36.9 °C)  Pulse  Av.7  Min: 62  Max: 76  Resp  Av  Min: 16  Max: 16  SpO2  Av.7 %  Min: 98 %  Max: 99 %  Height  Av' 2" (157.5 cm)  Min: 5' 2" (157.5 cm)  Max: 5' 2" (157.5 cm)  Weight  Av.4 kg (190 lb 7.3 oz)  Min: 86.2 kg (190 lb)  Max: 86.6 kg (190 lb 14.7 oz)  Body mass index is 34.92 kg/m².  No intake/output data recorded.    Physical " "Examination:  Physical Exam  Vitals reviewed.   Constitutional:       General: She is not in acute distress.     Appearance: She is not ill-appearing.   HENT:      Head: Normocephalic and atraumatic.   Cardiovascular:      Rate and Rhythm: Normal rate and regular rhythm.      Heart sounds: No murmur heard.  Pulmonary:      Effort: Pulmonary effort is normal.      Breath sounds: No wheezing, rhonchi or rales.   Abdominal:      General: Abdomen is flat. There is no distension.      Palpations: Abdomen is soft.      Tenderness: There is no guarding or rebound.      Comments: Mild RUQ tenderness   Musculoskeletal:      Right lower leg: No edema.      Left lower leg: No edema.   Skin:     General: Skin is warm and dry.      Coloration: Skin is not jaundiced.   Neurological:      General: No focal deficit present.      Mental Status: She is alert and oriented to person, place, and time.   Psychiatric:         Mood and Affect: Mood normal.         Behavior: Behavior normal.        Laboratory:  Most Recent Data:  CBC:   Lab Results   Component Value Date    WBC 8.47 05/09/2025    HGB 13.2 05/09/2025    HCT 38.4 05/09/2025     05/09/2025    MCV 82 05/09/2025    RDW 14.4 05/09/2025     BMP:   Lab Results   Component Value Date     05/09/2025    K 3.6 05/09/2025     05/09/2025    CO2 19 (L) 05/09/2025    BUN 8 05/09/2025    CREATININE 0.6 05/09/2025     (H) 05/09/2025    CALCIUM 9.5 05/09/2025     LFTs:   Lab Results   Component Value Date    PROT 7.7 05/09/2025    ALBUMIN 3.7 05/09/2025    BILITOT 1.5 (H) 05/09/2025     (H) 05/09/2025    ALKPHOS 166 (H) 05/09/2025     (H) 05/09/2025     (H) 05/09/2025     Coags: No results found for: "INR", "PROTIME", "PTT"  FLP:   Lab Results   Component Value Date    CHOL 176 05/09/2025    HDL 46 05/09/2025    LDLCALC 86.6 05/09/2025    TRIG 217 (H) 05/09/2025    CHOLHDL 26.1 05/09/2025     DM:   Lab Results   Component Value Date    HGBA1C 5.6 " "06/17/2024    GLUF 84 01/08/2010    LDLCALC 86.6 05/09/2025    CREATININE 0.6 05/09/2025     Thyroid:   Lab Results   Component Value Date    TSH 0.949 05/09/2025    FREET4 1.02 05/09/2025    FREET4 1.02 05/09/2025     Anemia: No results found for: "IRON", "TIBC", "FERRITIN", "QVLBRBYE21", "FOLATE"  Cardiac: No results found for: "TROPONINI", "CKTOTAL", "CKMB", "BNP"  Urinalysis:   Lab Results   Component Value Date    COLORU Yellow 05/09/2025    SPECGRAV 1.015 05/09/2025    NITRITE Negative 05/09/2025    KETONESU NEG 08/07/2012    UROBILINOGEN Negative 05/09/2025    WBCUA 5 05/09/2025       Microbiology Data:   None    Radiology:  Chest XR (my interpretation): normal chest xray     Abdominal US: Cholelithiasis/sludge, no secondary findings to suggest acute cholecystitis. Hepatic parenchymal findings may reflect steatosis or other infiltrative process, correlation with LFTs advised.    MRCP: Acute, interstitial pancreatitis, recommend correlation with lipase. No peripancreatic fluid collection. No biliary filling defect.     Other Results:  EKG (my interpretation): pending at the time of admission    Assessment:     Blanca Acosta is a 29 y.o. female with a PMHx of PCOS and HLD who presented on 05/09/2025 with 1 day of worsening abdominal pain. Abdominal US shows cholelithiasis without cholecystitis and MRCP does not suggest biliary obstruction but does suggest acute pancreatitis. She is admitted to [facility] internal medicine for further workup and management of her pancreatitis.      Plan:     Acute Pancreatitis   - Patient presented with RUQ and back pain, lipase elevated at 498, and MRCP showed acute pancreatitis   - Given her elevations in liver enzymes and cholelithiasis, I suspect her pancreatitis is likely biliary in origin. The patient also denies significant alcohol use   - She has no evidence of biliary obstruction, no white count elevation, and no fever to suggest cholangitis so will not initiate antibiotics " at this time   - GI and general surgery consulted   - Will start maintenance fluids with 100 cc/hr of LR   - Dilaudid PRN for abdominal pain   - Zofran prn for nausea   - NPO for now pending GI evaluation    Cholelithiasis  - There is concern for gallstone pancreatitis given her cholelithiasis  - General surgery consulted for evaluation     Hypertriglyceridemia   - , doubt this is the origin of her pancreatitis       PPx: Lovenox   Diet: NPO  Code: Full    Disposition: Pending improvement of symptoms and GI evaluation.       Chas Howard MD   hospitals Internal Medicine, -2    hospitals Medicine Hospitalist Pager numbers:   hospitals Hospitalist Medicine Team A (Fabricio/August): [phone]  hospitals Hospitalist Medicine Team B (Jaclyn/Shawn):  [phone]

## 2025-05-10 NOTE — PROGRESS NOTES
"[facility] Medicine Progress Note    Primary Team: [facility] Hospitalist Team B  Attending Physician: Keyshawn Anaya MD  Resident: Chas Howard MD   Intern: Ryan Hassan DO     Date of Admit: 2025     Chief Complaint: Back pain     Subjective/Interval Events:      Patient stable overnight. No further vomiting. Denies significant abdominal pain at this time. Tolerating PO.      Objective:   Last 24 Hour Vital Signs:  BP  Min: 123/75  Max: 147/91  Temp  Av.4 °F (36.9 °C)  Min: 98.2 °F (36.8 °C)  Max: 98.7 °F (37.1 °C)  Pulse  Av.5  Min: 62  Max: 76  Resp  Av  Min: 15  Max: 17  SpO2  Av.8 %  Min: 98 %  Max: 100 %  Height  Av' 2" (157.5 cm)  Min: 5' 2" (157.5 cm)  Max: 5' 2" (157.5 cm)  Weight  Av.4 kg (190 lb 7.3 oz)  Min: 86.2 kg (190 lb)  Max: 86.6 kg (190 lb 14.7 oz)    Intake/Output Summary (Last 24 hours) at 5/10/2025 0748  Last data filed at 5/10/2025 0611  Gross per 24 hour   Intake 728.59 ml   Output --   Net 728.59 ml       Physical Examination:  Physical Exam  Vitals reviewed.   Constitutional:       General: She is not in acute distress.     Appearance: She is not ill-appearing.   HENT:      Head: Normocephalic and atraumatic.   Eyes:      Conjunctiva/sclera: Conjunctivae normal.   Cardiovascular:      Rate and Rhythm: Normal rate and regular rhythm.      Heart sounds: No murmur heard.  Pulmonary:      Effort: No respiratory distress.      Breath sounds: No wheezing or rhonchi.   Abdominal:      General: Abdomen is flat. There is no distension.      Palpations: Abdomen is soft.      Tenderness: There is no rebound.      Comments: Mild RUQ tenderness   Musculoskeletal:      Right lower leg: No edema.      Left lower leg: No edema.   Skin:     General: Skin is warm and dry.   Neurological:      General: No focal deficit present.      Mental Status: She is alert and oriented to person, place, and time.   Psychiatric:         Mood and Affect: Mood normal.         Behavior: Behavior " normal.        Laboratory:  Reviewed     Microbiology:  Reviewed     Imaging:  Reviewed     Current Medications:     Scheduled:   enoxparin  40 mg Subcutaneous Daily         Infusions:   lactated ringers   Intravenous Continuous 100 mL/hr at 05/10/25 0611 Rate Verify at 05/10/25 0611        PRN:    Current Facility-Administered Medications:     HYDROmorphone, 0.5 mg, Intravenous, Q6H PRN    naloxone, 0.02 mg, Intravenous, PRN    ondansetron, 4 mg, Intravenous, Q6H PRN    sodium chloride 0.9%, 10 mL, Intravenous, Q12H PRN    Assessment:     Blanca Acosta is a 29 y.o. female with a PMHx of PCOS and HLD who presented on 05/09/2025 with 1 day of worsening abdominal pain. Abdominal US shows cholelithiasis without cholecystitis and MRCP does not suggest biliary obstruction but does suggest acute pancreatitis. She is admitted to [facility] internal medicine for further workup and management of her pancreatitis.     Plan:     Acute Pancreatitis   Transaminitis, Improving   - Patient presented with RUQ and back pain, lipase elevated at 498, and MRCP showed acute pancreatitis   - Given her elevations in liver enzymes and cholelithiasis, I suspect her pancreatitis is likely biliary in origin. The patient also denies significant alcohol use   - She has no evidence of biliary obstruction, no white count elevation, and no fever to suggest cholangitis so will not initiate antibiotics at this time   - GI consulted   - Gen surg recommending outpatient cholecystectomy within the next month   - Continue maintenance fluids with 100 cc/hr of LR   - Dilaudid PRN for abdominal pain   - Zofran prn for nausea   - Resume diet, patient tolerating PO     Cholelithiasis  - There is concern for gallstone pancreatitis given her cholelithiasis  - Gen surg recommending outpatient cholecystectomy within the next month      Hypertriglyceridemia   - , doubt this is the origin of her pancreatitis         PPx: Lovenox   Diet: Low fat   Code: Full      Disposition: Pending improvement of symptoms and GI evaluation.         Chas Howard MD   [facility] Internal Medicine, HO-2     [facility] Medicine Hospitalist Pager numbers:   [facility] Hospitalist Medicine Team A (Fabricio/August):          [phone]  [facility] Hospitalist Medicine Team B (Jaclyn/Shawn):        [phone]

## 2025-05-10 NOTE — MEDICAL/APP STUDENT
"[facility] Medicine Progress Note    Primary Team: [facility] Hospitalist Team B  Attending Physician: Keyshawn Anaya MD  Medical Student: Anantjani Gutierrez, MS-3      Date of Admit: 2025     Chief Complaint: RUQ and back pain      Subjective/Interval Events:      Patient reports feeling well today, states she has not any abdominal or back pain since yesterday morning. Denies headaches, SOB, chest pain, or dysuria. Says she was given the option to do cholecystectomy either today or elective outpatient in the next month and that she would prefer to do it outpatient.     Objective:   Last 24 Hour Vital Signs:  BP  Min: 123/75  Max: 147/91  Temp  Av.4 °F (36.9 °C)  Min: 98.2 °F (36.8 °C)  Max: 98.7 °F (37.1 °C)  Pulse  Av.5  Min: 62  Max: 76  Resp  Av  Min: 15  Max: 17  SpO2  Av.8 %  Min: 98 %  Max: 100 %  Height  Av' 2" (157.5 cm)  Min: 5' 2" (157.5 cm)  Max: 5' 2" (157.5 cm)  Weight  Av.4 kg (190 lb 7.3 oz)  Min: 86.2 kg (190 lb)  Max: 86.6 kg (190 lb 14.7 oz)    Intake/Output Summary (Last 24 hours) at 5/10/2025 0944  Last data filed at 5/10/2025 0611  Gross per 24 hour   Intake 728.59 ml   Output --   Net 728.59 ml       Physical Examination:  Physical Exam  Vitals and nursing note reviewed.   Constitutional:       General: She is not in acute distress.     Appearance: She is not ill-appearing.   HENT:      Head: Normocephalic and atraumatic.      Nose: Nose normal.      Mouth/Throat:      Mouth: Mucous membranes are moist.   Eyes:      General: No scleral icterus.     Extraocular Movements: Extraocular movements intact.      Conjunctiva/sclera: Conjunctivae normal.   Cardiovascular:      Rate and Rhythm: Normal rate and regular rhythm.      Pulses: Normal pulses.      Heart sounds: Normal heart sounds.   Pulmonary:      Effort: Pulmonary effort is normal.      Breath sounds: Normal breath sounds.   Abdominal:      General: Abdomen is flat. Bowel sounds are normal. There is no distension.     "  Palpations: Abdomen is soft.      Tenderness: There is no abdominal tenderness. There is no guarding or rebound.      Comments: No RUQ tenderness to palpation   Musculoskeletal:         General: No swelling or tenderness.      Comments: R mid back not tender to palpation   Skin:     General: Skin is warm.      Capillary Refill: Capillary refill takes less than 2 seconds.      Coloration: Skin is not jaundiced.   Neurological:      Mental Status: She is alert and oriented to person, place, and time.   Psychiatric:         Mood and Affect: Mood normal.         Behavior: Behavior normal.          Laboratory:  Recent Labs   Lab 05/09/25  1138 05/09/25  1251 05/10/25  0517   WBC 8.47  --   --    HGB 13.2  --   --    HCT 38.4  --   --      --   --    MCV 82  --   --    RDW 14.4  --   --    NA  --  138 142   K  --  3.6 4.2   CL  --  107 110   CO2  --  19* 24   BUN  --  8 9   CREATININE  --  0.6 0.6   GLU  --  141* 79   PROT  --  7.7 7.1   ALBUMIN  --  3.7 3.4*   BILITOT  --  1.5* 1.0   AST  --  343* 124*   ALKPHOS  --  166* 138   ALT  --  314* 226*     Lab Results   Component Value Date    CALCIUM 9.4 05/10/2025    PHOS 4.0 05/10/2025     Recent Labs   Lab 05/10/25  0517   MG 1.9     Lab Results   Component Value Date    HGBA1C 5.4 05/10/2025    HGBA1C 5.6 06/17/2024     Lab Results   Component Value Date    LIPASE 498 (H) 05/09/2025     Recent Labs   Lab 05/09/25  1519 05/09/25  1610   CHOL  --  176   TRIG 218* 217*   HDL  --  46     Lab Results   Component Value Date     (H) 05/09/2025       Microbiology:  None.    Imaging:  Imaging Results              X-Ray Chest 1 View (Final result)  Result time 05/09/25 18:00:28      Final result by Aristides Allison DO (05/09/25 18:00:28)                   Impression:      No acute abnormality.      Electronically signed by: Aristides Allison  Date:    05/09/2025  Time:    18:00               Narrative:    EXAMINATION:  XR CHEST 1 VIEW    CLINICAL HISTORY:  abdominal  pain;    TECHNIQUE:  Single frontal view of the chest was performed.    COMPARISON:  None    FINDINGS:  The lungs are well expanded and clear. No focal opacities are seen. The pleural spaces are clear. The cardiac silhouette is unremarkable. The visualized osseous structures are unremarkable.                                       MRI MRCP Abdomen WO Contrast 3D WO Independent WS XPD (Final result)  Result time 05/09/25 17:28:27      Final result by Aristides Allison DO (05/09/25 17:28:27)                   Impression:      Acute, interstitial pancreatitis, recommend correlation with lipase.  No peripancreatic fluid collection.    Cholelithiasis without evidence of acute cholecystitis.      Electronically signed by: Aristides Allison  Date:    05/09/2025  Time:    17:28               Narrative:    EXAMINATION:  MRI MRCP ABDOMEN WO CONTRAST 3D WO INDEPENDENT WS XPD    CLINICAL HISTORY:  Cholelithiasis;    TECHNIQUE:  Multiplanar, multisequence magnetic resonance images of the liver and upper abdomen without intravenous contrast. Additionally 2D and 3D MRCP sequences are performed as well as MIP images.    COMPARISON:  Ultrasound from 05/09/2025.    FINDINGS:  Pulmonary Bases: No gross abnormality.    Liver: The liver is normal in size and demonstrates normal signal.  No evidence of fat or iron deposition.  No focal lesion.    Bile Ducts: No intra or extrahepatic biliary ductal dilation.  There is no evidence of a biliary filling defect.    Gallbladder: There are gallstones.  There is no gallbladder wall thickening or pericholecystic fluid or inflammatory change.    Pancreas: There is minimal pancreatic and peripancreatic edema, compatible with acute, interstitial pancreatitis.  There is no peripancreatic fluid collection.  There is no pancreatic ductal dilation.    Spleen: Normal size without focal lesion.    Gastrointestinal tract: No bowel wall thickening or obstruction.    Mesentery and retroperitoneum: No ascites,  focal fluid collection, or free air.    Lymph nodes: No evidence of lymphadenopathy.    Adrenal Glands: Unremarkable.    Kidneys: Normal without focal lesion or hydronephrosis.    Aorta and Abdominal Vasculature: No aneurysm.    Body wall: Unremarkable.    Musculoskeletal: Normal marrow signal.                                       US Abdomen Limited (Gallbladder) (Final result)  Result time 05/09/25 12:17:59      Final result by Ryan Lundy MD (05/09/25 12:17:59)                   Impression:      Cholelithiasis/sludge, no secondary findings to suggest acute cholecystitis.    Hepatic parenchymal findings may reflect steatosis or other infiltrative process, correlation with LFTs advised.      Electronically signed by: Ryan Lundy MD  Date:    05/09/2025  Time:    12:17               Narrative:    EXAMINATION:  US ABDOMEN LIMITED    CLINICAL HISTORY:  Upper abdominal pain, unspecified    TECHNIQUE:  Limited ultrasound of the right upper quadrant of the abdomen (including pancreas, liver, gallbladder, common bile duct, and spleen) was performed.    COMPARISON:  None.    FINDINGS:  The visualized portions of the pancreas are unremarkable.  There is cholelithiasis/sludge.  The gallbladder is relatively decompressed.  No gallbladder wall thickening or pericholecystic fluid.  No gallbladder wall hyperemia.  Sonographic Morocho sign is negative.  The common duct is not dilated measuring 4 mm.  The a Paddock parenchyma is heterogeneous and somewhat echogenic.  The visualized portions of the right kidney are unremarkable.  No ascites.                                        Current Medications:     Scheduled:   enoxparin  40 mg Subcutaneous Daily         Infusions:   lactated ringers   Intravenous Continuous 100 mL/hr at 05/10/25 0611 Rate Verify at 05/10/25 0611        PRN:    Current Facility-Administered Medications:     HYDROmorphone, 0.5 mg, Intravenous, Q6H PRN    naloxone, 0.02 mg, Intravenous, PRN     ondansetron, 4 mg, Intravenous, Q6H PRN    sodium chloride 0.9%, 10 mL, Intravenous, Q12H PRN    Assessment:     Blanca Acosta is a 29 y.o. female with a PMHx of Hyperlipidemia and PCOS presents 5/9 for 2 days of acute stabbing back pain and 1 day of acute nausea and RUQ/epigastric pain. Abdominal ultrasound revealed cholelithiasis without cholecystitis or common bile duct dilation; MRCP showed acute pancreatitis, but no obvious biliary obstruction. Admitted to \[facility]"" Internal Medicine Team B for further workup and management of acute pancreatitis    Plan:     Acute Pancreatitis  Transaminitis, improving  - Presented with RUQ and back pain, elevated lipase of 498, and acute pancreatitis seen on MRCP  - Suspect pancreatitis is complication of gallstones due to cholelithiasis, elevated LFTs, history of hyperlipidemia, and reported lack of significant alcohol use.   - Low suspicion for cholangitis given lack of fever/chills, lack of WBC elevation, and US/MRCP both without obvious biliary obstruction, so not initiating antibiotics at this time  - GI consulted, NPO at this time awaiting their evaluation  - General Surgery consulted; recommend elective cholecystectomy outpatient within 1 month  - Continuing maintenance fluids: LR at 100 cc/hr  - Dilaudid PRN for abdominal pain  - Zofran PRN for nausea    Cholelithiasis  - Abdominal ultrasound on arrival with cholelithiasis without cholecystitis  - Suspicion of gallstone pancreatitis, however, no biliary obstructions were seen on MRCP  - Consulted General Surgery, recommends outpatient cholecystectomy within 1 month    Hypertriglyceridemia  - Triglycerides 217; low suspicion for cause of pancreatitis      Diet: Was NPO for possible procedure today, now Regular diet   Code: Full  Dispo: Home today, schedule outpatient elective cholecystectomy this week      Natalya Gutierrez MS-3  \[facility]\"" School of Medicine    \[facility]\"" Medicine Hospitalist Pager numbers:   \[facility]\"" Hospitalist Medicine Team A  (Fabricio/August): [phone]  [facility] Hospitalist Medicine Team B (Jaclyn/Shawn):  [phone]

## 2025-05-10 NOTE — CONSULTS
"[facility]    CC: Abdominal pain    HPI 29 y.o. female h/o PCOS came in with a 2 day history of mid back pain that radiated to the RUQ with associated nausea. Reports this occurring intermittently for the last few months. She was evaluated for this by orthopedics which she was told was a possible muscle spasm. Denies fever and chills. Denies diarrhea and constipation. Pain has resolved since arrival.    Chart reviewed and summarized here, collateral obtained from nursing staff.    Past Medical History  History reviewed. No pertinent past medical history.     Social History  Social History[1]    Family History  No family history of gastric or colonic malignancy. No family history of pancreatic or liver disease     Physical Examination  /81   Pulse 68   Temp 98.4 °F (36.9 °C) (Oral)   Resp 17   Ht 5' 2" (1.575 m)   Wt 86.6 kg (190 lb 14.7 oz)   SpO2 98%   Breastfeeding No   BMI 34.92 kg/m²   General appearance: alert, cooperative, no distress  Eyes: conjunctivae/corneas clear  Lungs: no increased work of breathing  Heart: 2+ radial pulses regular and symmetric  Abdomen: soft, mild TTP RUQ  Extremities: extremities symmetric; no clubbing, cyanosis, or edema      Labs:  Recent Labs   Lab 05/09/25  1138 05/09/25  1251 05/10/25  0517   WBC 8.47  --   --    HGB 13.2  --   --    HCT 38.4  --   --      --   --    NA  --  138 142   K  --  3.6 4.2   CL  --  107 110   CREATININE  --  0.6 0.6   BUN  --  8 9   CO2  --  19* 24   ALT  --  314* 226*   AST  --  343* 124*        Imaging:  MRCP with no stones in the CBD which is 4mm. Pancreatitis. Cholelithiasis  I have personally reviewed and interpreted these images.    Assessment:   29F with a history of PCOS presenting due to gallstone pancreatitis based mostly on imaging and symptoms. Reports improvement today, and has elected to undergo cholecystectomy as an outpatient, which I think is appropriate.     Plan:  Restart low fat diet  Encourage PO " hydration  Cholecystectomy as per surgery  Ok for discharge from a GI perspective    Discussed with Dr. Suki Nicolas DO  [facility] Fellow, PGY IV          [1]   Social History  Tobacco Use    Smoking status: Unknown

## 2025-05-10 NOTE — PROGRESS NOTES
The sw spoke to the pt and her brother will transport her home at d/c. The sw stressed the importance of the pt going to her hsp f/u's and taking her medications. The pt acknowledged understanding and states she will comply. The pt has no further questions or Case Management needs. The pt's clear to d/c once her transport arrives.     [facility] Surg  Discharge Final Note    Primary Care Provider: Unique Cerrato MD    Expected Discharge Date: 5/10/2025    Final Discharge Note (most recent)       Final Note - 05/10/25 1448          Final Note    Assessment Type Discharge Planning Brief Assessment                     Important Message from Medicare             Contact Info       Unique Cerrato MD   Specialty: Internal Medicine   Relationship: PCP - General    [address]  [address]  [facility] [facility] [address]   Phone: [phone]       Next Steps: Schedule an appointment as soon as possible for a visit in 1 week(s)    Instructions: The pt has been instructed to call the number listed above to schudule her follow up becaus the offices are closed over the weekend.    Maksim Santa MD   Specialty: Surgery, General Surgery    [address]  [address]  [facility] [address]   Phone: [phone]       Next Steps: Follow up in 1 week(s)    Instructions: The sw requested a follow up with the provider listed above. The pt will receive a call with the appointment. The pt can call the number listed above to check on the status of the appt.

## 2025-05-10 NOTE — CONSULTS
[facility] Surg  General Surgery  Consult Note    Patient Name: Blanca Acosta  MRN: [id number]  Code Status: Full Code  Admission Date: 5/9/2025  Hospital Length of Stay: 0 days  Attending Physician: Keyshawn Anaya MD  Primary Care Provider: Mick العلي DO    Patient information was obtained from patient, past medical records, and primary team.     Inpatient consult to General surgery  Consult performed by: Silvestre Salter MD  Consult ordered by: Chas Howard MD        Subjective:     Principal Problem: Calculus of gallbladder without cholecystitis    History of Present Illness: Blanca Acosta is a 29 y.o. female with a PMHx of PCOS and mild hyperlipidemia. Patient presents with complaints of mid back pain and RUQ pain for two days with associated nausea. She reports having similar pain in the past. She denies fevers or chills. Today she reports her pain has improved. No cardiac issues and she does not take any blood thinners. No prior abdominal surgeries.    On admission her bilirubin was elevated to 1.5 with elevated transaminases. Lipase mildly elevated at 498. US without cholecystitis and CBD was 4 mm. MRCP was also obtained showing no evidence of stones in the CBD. This morning her bilirubin is 1.    No current facility-administered medications on file prior to encounter.     Current Outpatient Medications on File Prior to Encounter   Medication Sig    ESTARYLLA 0.25-0.035 mg per tablet Take 1 tablet by mouth once daily.       Review of patient's allergies indicates:  No Known Allergies    History reviewed. No pertinent past medical history.  History reviewed. No pertinent surgical history.  Family History    None       Tobacco Use    Smoking status: Unknown    Smokeless tobacco: Not on file   Substance and Sexual Activity    Alcohol use: Not on file    Drug use: Not on file    Sexual activity: Not on file     Review of Systems   Constitutional:  Negative for chills and fever.   Respiratory:   Negative for shortness of breath.    Cardiovascular:  Negative for chest pain.   Gastrointestinal:  Positive for abdominal pain and nausea. Negative for vomiting.   Genitourinary:  Negative for dysuria.   Neurological:  Negative for dizziness and weakness.     Objective:     Vital Signs (Most Recent):  Temp: 98.4 °F (36.9 °C) (05/10/25 0707)  Pulse: 68 (05/10/25 0707)  Resp: 17 (05/10/25 0707)  BP: 128/81 (05/10/25 0707)  SpO2: 98 % (05/10/25 0707) Vital Signs (24h Range):  Temp:  [98.2 °F (36.8 °C)-98.7 °F (37.1 °C)] 98.4 °F (36.9 °C)  Pulse:  [62-76] 68  Resp:  [15-17] 17  SpO2:  [98 %-100 %] 98 %  BP: (123-147)/(75-91) 128/81     Weight: 86.6 kg (190 lb 14.7 oz)  Body mass index is 34.92 kg/m².     Physical Exam  Constitutional:       Appearance: Normal appearance.   HENT:      Head: Normocephalic and atraumatic.      Mouth/Throat:      Mouth: Mucous membranes are moist.      Pharynx: Oropharynx is clear.   Eyes:      Extraocular Movements: Extraocular movements intact.      Conjunctiva/sclera: Conjunctivae normal.   Pulmonary:      Effort: Pulmonary effort is normal.   Abdominal:      General: Abdomen is flat.      Palpations: Abdomen is soft.   Musculoskeletal:         General: Normal range of motion.   Skin:     General: Skin is warm and dry.   Neurological:      General: No focal deficit present.      Mental Status: She is alert and oriented to person, place, and time.   Psychiatric:         Mood and Affect: Mood normal.         Behavior: Behavior normal.            I have reviewed all pertinent lab results within the past 24 hours.    Significant Diagnostics:  I have reviewed all pertinent imaging results/findings within the past 24 hours.    Assessment/Plan:     * Calculus of gallbladder without cholecystitis  29 y.o. female who presented with elevated bilirubin, LFTs, and lipase concerning for choledocholithiasis and gallstone pancreatitis. Labs have downtrended today and her pain has improved. The stone  likely passed. Discussed surgery with the patient, she prefers to have surgery in the outpatient setting.    Recommendations:  Can have a diet  If tolerates a diet can discharge and follow up with general surgery in one week to discuss and schedule surgery      VTE Risk Mitigation (From admission, onward)           Ordered     enoxaparin injection 40 mg  Daily         05/09/25 1633     IP VTE HIGH RISK PATIENT  Once         05/09/25 1633     Place sequential compression device  Until discontinued         05/09/25 1633                    Thank you for your consult. I will sign off. Please contact us if you have any additional questions.    Silvestre Salter MD  General Surgery  [facility] Surg

## 2025-05-10 NOTE — DISCHARGE SUMMARY
[facility] Medicine Discharge Summary    Primary Team: [facility] Hospitalist Team B  Attending Physician: Keyshawn Anaya MD  Resident: Chas Howard MD   Intern: Ryan Hassan DO    Date of Admit: 5/9/2025  Date of Discharge: 5/10/2025    Discharge to: Home  Condition: Stable     Discharge Diagnoses     Acute Biliary Pancreatitis   Cholelithiasis Without Acute Cholecystitis   Transaminitis   Hypertriglyceridemia     Consultants and Procedures     Consultants:  GI, General surgery     Procedures:   None    Brief History of Present Illness      HPI: Blanca Acosta is a 29 y.o. female with a PMHx of PCOS and mild hyperlipidemia. The patient presented on 5/9/2025 for evaluation of 2 days of recurrent mid back pain and 1 day of RUQ/epigastric pain. States a stabbing mid back pain awoke her from sleep 2 nights ago and self resolved within a few minutes. Reports onset of nausea after lunch yesterday for few hours, later accompanied by a new, dull, pressure-like pain in the RUQ/epigastric region lasting about 30 minutes. Patient states the abdominal pain was mildly relieved by an antacid and Sprite before self-resolving. Reports decreased appetite and recurrence of the abdominal pain this morning immediately after breakfast (toast and eggs) until she arrived at the ED around 10:30. She says yesterday was the first time she has had the abdominal pain, but that she has been having the stabbing back pain every few weeks since July 2024. States she previously saw a PCP and an orthopedist for it, both of whom were treating her for muscular pain due to negative x-rays. States the orthopedist gave her a muscle relaxant for the pain, which helped moderately the first few times she took it before it stopped working, and reports NSAIDs failed to control the pain. She says her fingers get cold and her mother tells her she looks pale during these episodes.  Reports diet rich in rice, beans, and cheese, with minimal fried food, and denies  association of pain with any specific food. Denies fever, chills, constipation, diarrhea, dysuria, and vomiting.    Hospital Course: MRCP did not show evidence of choledocholithiasis, but showed evidence of pancreatitis. The patient was evaluated by GI who did not recommend ERCP. Patient was evaluated by general surgery who recommended outpatient cholecystectomy within the next months.     For the full HPI please refer to the History & Physical from this admission.    Hospital Course By Problem with Pertinent Findings     Acute Biliary Pancreatitis   Cholelithiasis Without Acute Cholecystitis   Transaminitis   - Patient's abdominal pain/nausea were minimal and she tolerated PO intake without nausea or abdominal pain  - Suspect patient had a biliary stone that passed   - Outpatient referral placed to general surgery for cholecystectomy   - Zofran PRN for nausea prescribed     Hypertriglyceridemia   - Continue to follow with PCP     Discharge Medications        Medication List        START taking these medications      ondansetron 4 MG Tbdl  Commonly known as: ZOFRAN-ODT  Take 1 tablet (4 mg total) by mouth every 8 (eight) hours as needed (For nausea).            CONTINUE taking these medications      ESTARYLLA 0.25-0.035 mg per tablet  Generic drug: norgestimate-ethinyl estradioL               Where to Get Your Medications        These medications were sent to [id number] - [place] - [address] [facility] [address]  [address] [address]      Phone: [phone]   ondansetron 4 MG Tbdl         Discharge Information:   Diet:  Low fat diet    Physical Activity:  As tolerated by patient.              Instructions:  1. Take all medications as prescribed  2. Keep all follow-up appointments  3. Return to the hospital or call your primary care physicians if any worsening symptoms such as fever, chest pain, shortness of breath, return of symptoms, or any other  concerns.    Follow-Up Appointments:  PCP, General surgery       Chas Howard MD   [facility] Internal Medicine, -

## 2025-05-10 NOTE — HPI
Blanca Acosta is a 29 y.o. female with a PMHx of PCOS and mild hyperlipidemia. Patient presents with complaints of mid back pain and RUQ pain for two days with associated nausea. She reports having similar pain in the past. She denies fevers or chills. Today she reports her pain has improved. No cardiac issues and she does not take any blood thinners. No prior abdominal surgeries.    On admission her bilirubin was elevated to 1.5 with elevated transaminases. Lipase mildly elevated at 498. US without cholecystitis and CBD was 4 mm. MRCP was also obtained showing no evidence of stones in the CBD. This morning her bilirubin is 1.

## 2025-05-10 NOTE — SUBJECTIVE & OBJECTIVE
No current facility-administered medications on file prior to encounter.     Current Outpatient Medications on File Prior to Encounter   Medication Sig    ESTARYLLA 0.25-0.035 mg per tablet Take 1 tablet by mouth once daily.       Review of patient's allergies indicates:  No Known Allergies    History reviewed. No pertinent past medical history.  History reviewed. No pertinent surgical history.  Family History    None       Tobacco Use    Smoking status: Unknown    Smokeless tobacco: Not on file   Substance and Sexual Activity    Alcohol use: Not on file    Drug use: Not on file    Sexual activity: Not on file     Review of Systems   Constitutional:  Negative for chills and fever.   Respiratory:  Negative for shortness of breath.    Cardiovascular:  Negative for chest pain.   Gastrointestinal:  Positive for abdominal pain and nausea. Negative for vomiting.   Genitourinary:  Negative for dysuria.   Neurological:  Negative for dizziness and weakness.     Objective:     Vital Signs (Most Recent):  Temp: 98.4 °F (36.9 °C) (05/10/25 0707)  Pulse: 68 (05/10/25 0707)  Resp: 17 (05/10/25 0707)  BP: 128/81 (05/10/25 0707)  SpO2: 98 % (05/10/25 0707) Vital Signs (24h Range):  Temp:  [98.2 °F (36.8 °C)-98.7 °F (37.1 °C)] 98.4 °F (36.9 °C)  Pulse:  [62-76] 68  Resp:  [15-17] 17  SpO2:  [98 %-100 %] 98 %  BP: (123-147)/(75-91) 128/81     Weight: 86.6 kg (190 lb 14.7 oz)  Body mass index is 34.92 kg/m².     Physical Exam  Constitutional:       Appearance: Normal appearance.   HENT:      Head: Normocephalic and atraumatic.      Mouth/Throat:      Mouth: Mucous membranes are moist.      Pharynx: Oropharynx is clear.   Eyes:      Extraocular Movements: Extraocular movements intact.      Conjunctiva/sclera: Conjunctivae normal.   Pulmonary:      Effort: Pulmonary effort is normal.   Abdominal:      General: Abdomen is flat.      Palpations: Abdomen is soft.   Musculoskeletal:         General: Normal range of motion.   Skin:      General: Skin is warm and dry.   Neurological:      General: No focal deficit present.      Mental Status: She is alert and oriented to person, place, and time.   Psychiatric:         Mood and Affect: Mood normal.         Behavior: Behavior normal.            I have reviewed all pertinent lab results within the past 24 hours.    Significant Diagnostics:  I have reviewed all pertinent imaging results/findings within the past 24 hours.

## 2025-05-10 NOTE — PLAN OF CARE
The sw spoke to the pt to complete the assessment. The pt lives in [place]. The pt list her brother Toby Acosta [phone] as her emergency contact. The pt's independent with her ADL's and doesn't use dme. The pt still drives but Toby will transport her home at d/c. 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 The sw will continue to follow there pt throughout her transitions of care and will assist with any d/c needs.     [facility]  Discharge Assessment    Primary Care Provider: Unique Cerrato MD     Discharge Assessment (most recent)       BRIEF DISCHARGE ASSESSMENT - 05/10/25 5717          Discharge Planning    Assessment Type Discharge Planning Brief Assessment (P)      Resource/Environmental Concerns none (P)      Support Systems Family members;Friends/neighbors (P)      Equipment Currently Used at Home none (P)      Current Living Arrangements home (P)      Patient/Family Anticipates Transition to home with family (P)      Patient/Family Anticipated Services at Transition none (P)      DME Needed Upon Discharge  none (P)      Discharge Plan A Home with family (P)      Discharge Plan B Home Health (P)

## 2025-05-10 NOTE — ASSESSMENT & PLAN NOTE
29 y.o. female who presented with elevated bilirubin, LFTs, and lipase concerning for choledocholithiasis and gallstone pancreatitis. Labs have downtrended today and her pain has improved. The stone likely passed. Discussed surgery with the patient, she prefers to have surgery in the outpatient setting.    Recommendations:  Can have a diet  If tolerates a diet can discharge and follow up with general surgery in one week to discuss and schedule surgery

## 2025-05-10 NOTE — PLAN OF CARE
VN reviewed discharge instructions with pt. Using teach back method.  AVS printed and handed to pt by bedside nurse.  Reviewed follow-up appointments, medications, diet, and importance of medication compliance.  Reviewed home care instructions, treatment plan, self-management, and when to seek medical attention.  Allowed time for questions.  All questions answered.  Patient verbalized complete understanding of discharge instructions and voices no concerns.     Discharge instructions complete.Transport/wheelchair requested.  Bedside nurse notified.

## 2025-05-27 ENCOUNTER — OFFICE VISIT (OUTPATIENT)
Dept: SURGERY | Facility: CLINIC | Age: 30
End: 2025-05-27
Payer: COMMERCIAL

## 2025-05-27 VITALS
BODY MASS INDEX: 36.47 KG/M2 | TEMPERATURE: 99 F | SYSTOLIC BLOOD PRESSURE: 130 MMHG | HEART RATE: 78 BPM | HEIGHT: 62 IN | DIASTOLIC BLOOD PRESSURE: 85 MMHG | WEIGHT: 198.19 LBS

## 2025-05-27 DIAGNOSIS — K85.10 ACUTE BILIARY PANCREATITIS WITHOUT INFECTION OR NECROSIS: ICD-10-CM

## 2025-05-27 PROCEDURE — 99999 PR PBB SHADOW E&M-EST. PATIENT-LVL IV: CPT | Mod: PBBFAC,,, | Performed by: STUDENT IN AN ORGANIZED HEALTH CARE EDUCATION/TRAINING PROGRAM

## 2025-05-27 PROCEDURE — 3044F HG A1C LEVEL LT 7.0%: CPT | Mod: CPTII,S$GLB,, | Performed by: STUDENT IN AN ORGANIZED HEALTH CARE EDUCATION/TRAINING PROGRAM

## 2025-05-27 PROCEDURE — 3075F SYST BP GE 130 - 139MM HG: CPT | Mod: CPTII,S$GLB,, | Performed by: STUDENT IN AN ORGANIZED HEALTH CARE EDUCATION/TRAINING PROGRAM

## 2025-05-27 PROCEDURE — 3079F DIAST BP 80-89 MM HG: CPT | Mod: CPTII,S$GLB,, | Performed by: STUDENT IN AN ORGANIZED HEALTH CARE EDUCATION/TRAINING PROGRAM

## 2025-05-27 PROCEDURE — 3008F BODY MASS INDEX DOCD: CPT | Mod: CPTII,S$GLB,, | Performed by: STUDENT IN AN ORGANIZED HEALTH CARE EDUCATION/TRAINING PROGRAM

## 2025-05-27 PROCEDURE — 1159F MED LIST DOCD IN RCRD: CPT | Mod: CPTII,S$GLB,, | Performed by: STUDENT IN AN ORGANIZED HEALTH CARE EDUCATION/TRAINING PROGRAM

## 2025-05-27 PROCEDURE — 99214 OFFICE O/P EST MOD 30 MIN: CPT | Mod: S$GLB,,, | Performed by: STUDENT IN AN ORGANIZED HEALTH CARE EDUCATION/TRAINING PROGRAM

## 2025-05-27 RX ORDER — SODIUM CHLORIDE 9 MG/ML
INJECTION, SOLUTION INTRAVENOUS CONTINUOUS
OUTPATIENT
Start: 2025-05-27

## 2025-05-27 RX ORDER — INDOCYANINE GREEN AND WATER 25 MG
1.25 KIT INJECTION ONCE
OUTPATIENT
Start: 2025-05-27 | End: 2025-05-27

## 2025-05-27 NOTE — PROGRESS NOTES
[facility] History & Physical         Name: Blanca Acosta   : 1995   MRN: [id number]      History of Present Illness       Chief Compliant:  Gallstone pancreatitis     HPI:  Blanca Acosta is a 29 y.o. female who presents to General surgery Clinic and follow up from an admission for gallstone pancreatitis.  Her symptoms and labs rapidly improved during her hospitalization and she desired a outpatient evaluation of her gallbladder.  She has so far been symptom-free and in her normal state of health.  Looking back, she believes some episodes of back pain were actually manifestations of biliary colic/pancreatitis type symptoms.  No previous abdominal surgeries.      No past medical history on file.  No past surgical history on file.  No family history on file.  Patient has no known allergies.  Social Drivers of Health     Tobacco Use: Low Risk  (2025)    Received from [facility]    Patient History     Smoking Tobacco Use: Never     Smokeless Tobacco Use: Never     Passive Exposure: Not on file   Alcohol Use: Not on file   Financial Resource Strain: Low Risk  (2025)    Overall Financial Resource Strain (CARDIA)     Difficulty of Paying Living Expenses: Not very hard   Food Insecurity: No Food Insecurity (2025)    Hunger Vital Sign     Worried About Running Out of Food in the Last Year: Never true     Ran Out of Food in the Last Year: Never true   Transportation Needs: No Transportation Needs (2025)    PRAPARE - Transportation     Lack of Transportation (Medical): No     Lack of Transportation (Non-Medical): No   Physical Activity: Not on file   Stress: No Stress Concern Present (2025)    [employer] - Occupational Stress Questionnaire     Feeling of Stress : Not at all   Housing Stability: Low Risk  (2025)    Housing Stability Vital Sign     Unable to Pay for Housing in the Last Year: No     Number of Times Moved in the Last Year: 0     Homeless  "in the Last Year: No   Depression: Not at risk (6/17/2024)    [facility] from [facility]    PHQ-2     Patient Health Questionnaire-2 Score: 0   Utilities: Not At Risk (5/9/2025)    [facility] Utilities     Threatened with loss of utilities: No   Health Literacy: Adequate Health Literacy (5/9/2025)     Health Literacy     Frequency of need for help with medical instructions: Rarely   Social Isolation: Not on file        Home Medications:   Prior to Admission medications    Medication Sig Start Date End Date Taking? Authorizing Provider   ESTARYLLA 0.25-0.035 mg per tablet Take 1 tablet by mouth once daily.   Yes Provider, Historical       Review of Systems: Complete review of systems elicited and pertinent information is in the HPI    Physical Exam:     Vitals:  Vitals:    05/27/25 0815   BP: 130/85   Pulse: 78   Temp: 98.5 °F (36.9 °C)   TempSrc: Oral   Weight: 89.9 kg (198 lb 3.1 oz)   Height: 5' 2" (1.575 m)           General Appearance:  Alert, cooperative, no distress, appears stated age   Head:  Normocephalic, without obvious abnormality, atraumatic   Eyes:  No scleral icterus   Nose: Nares normal, septum midline   Throat: Lips, mucosa, and tongue normal   Neck: Supple, symmetrical, trachea midline, speaks with normal voice   Lungs:   respirations unlabored   Heart:  Regular rate    Abdomen:   Soft, non-tender, non-distended   Genitalia:  Deferred   Rectal:  Deferred    Extremities: Extremities normal, atraumatic, no cyanosis or edema   Pulses: 2+ and symmetric   Skin: No jaundice   Neurologic: No focal deficits        Results Reviewed:     Labs and imaging reviewed and interpreted by me, as refected in HPI and A&P.  Imaging reviewed, MRCP showing no obvious obstructions but some mild pancreatitis    Assessment and plan:     This is a 29-year-old woman with a history of gallstone pancreatitis and cholelithiasis.  We discussed the options and those include gallbladder surgery versus observation.  I believe that " observation carries substantial risk of recurrent and potentially worsened manifestations of gallstone disease, including pancreatitis or cholecystitis.  My recommendation is minimally invasive gallbladder surgery.  We discussed the technical and convalescent details of robot assisted laparoscopic cholecystectomy.  We reviewed all the risks benefits.  As a guide to that discussion, I used the patient education materials from the [employer]. I reviewed that entire document all of the risks listed therein. I provided a copy to the patient and affixed a copy to the consent.  We also discussed possibility of a subtotal cholecystectomy and I completely reviewed that operation and the additional procedures that could be needed if such an operation were necessary. The patient consented in writing to proceed.      History, physical exam, laboratory, and radiographic findings were reviewed.        Rafiq Kan MD, FACS  General and Endocrine Surgery         8:50 AM, 5/27/2025

## 2025-06-10 PROBLEM — R10.10 UPPER ABDOMINAL PAIN: Status: ACTIVE | Noted: 2025-06-10

## 2025-06-22 ENCOUNTER — HOSPITAL ENCOUNTER (OUTPATIENT)
Facility: HOSPITAL | Age: 30
Discharge: LEFT BEFORE DISCHARGE | End: 2025-06-22
Attending: EMERGENCY MEDICINE | Admitting: EMERGENCY MEDICINE
Payer: COMMERCIAL

## 2025-06-22 VITALS
OXYGEN SATURATION: 96 % | TEMPERATURE: 98 F | WEIGHT: 198.19 LBS | BODY MASS INDEX: 36.47 KG/M2 | HEART RATE: 70 BPM | SYSTOLIC BLOOD PRESSURE: 138 MMHG | RESPIRATION RATE: 16 BRPM | DIASTOLIC BLOOD PRESSURE: 87 MMHG | HEIGHT: 62 IN

## 2025-06-22 DIAGNOSIS — R10.9 FLANK PAIN: ICD-10-CM

## 2025-06-22 DIAGNOSIS — R07.9 CHEST PAIN: ICD-10-CM

## 2025-06-22 LAB
ABSOLUTE EOSINOPHIL (OHS): 0.06 K/UL
ABSOLUTE MONOCYTE (OHS): 0.5 K/UL (ref 0.3–1)
ABSOLUTE NEUTROPHIL COUNT (OHS): 6.39 K/UL (ref 1.8–7.7)
ALBUMIN SERPL BCP-MCNC: 3.6 G/DL (ref 3.5–5.2)
ALP SERPL-CCNC: 117 UNIT/L (ref 40–150)
ALT SERPL W/O P-5'-P-CCNC: 223 UNIT/L (ref 10–44)
AMORPH CRY UR QL COMP ASSIST: ABNORMAL
ANION GAP (OHS): 9 MMOL/L (ref 8–16)
AST SERPL-CCNC: 402 UNIT/L (ref 11–45)
B-HCG UR QL: NEGATIVE
BACTERIA #/AREA URNS AUTO: ABNORMAL /HPF
BASOPHILS # BLD AUTO: 0.05 K/UL
BASOPHILS NFR BLD AUTO: 0.6 %
BILIRUB SERPL-MCNC: 1.5 MG/DL (ref 0.1–1)
BILIRUB UR QL STRIP.AUTO: NEGATIVE
BUN SERPL-MCNC: 8 MG/DL (ref 6–20)
CALCIUM SERPL-MCNC: 8.5 MG/DL (ref 8.7–10.5)
CHLORIDE SERPL-SCNC: 110 MMOL/L (ref 95–110)
CLARITY UR: ABNORMAL
CO2 SERPL-SCNC: 19 MMOL/L (ref 23–29)
COLOR UR AUTO: YELLOW
CREAT SERPL-MCNC: 0.5 MG/DL (ref 0.5–1.4)
CTP QC/QA: YES
ERYTHROCYTE [DISTWIDTH] IN BLOOD BY AUTOMATED COUNT: 14.2 % (ref 11.5–14.5)
GFR SERPLBLD CREATININE-BSD FMLA CKD-EPI: >60 ML/MIN/1.73/M2
GLUCOSE SERPL-MCNC: 128 MG/DL (ref 70–110)
GLUCOSE UR QL STRIP: NEGATIVE
HCT VFR BLD AUTO: 36.1 % (ref 37–48.5)
HCV AB SERPL QL IA: NORMAL
HGB BLD-MCNC: 11.6 GM/DL (ref 12–16)
HGB UR QL STRIP: NEGATIVE
HIV 1+2 AB+HIV1 P24 AG SERPL QL IA: NORMAL
HOLD SPECIMEN: NORMAL
HOLD SPECIMEN: NORMAL
IMM GRANULOCYTES # BLD AUTO: 0.02 K/UL (ref 0–0.04)
IMM GRANULOCYTES NFR BLD AUTO: 0.2 % (ref 0–0.5)
KETONES UR QL STRIP: NEGATIVE
LEUKOCYTE ESTERASE UR QL STRIP: ABNORMAL
LIPASE SERPL-CCNC: 22 U/L (ref 4–60)
LYMPHOCYTES # BLD AUTO: 1.42 K/UL (ref 1–4.8)
MCH RBC QN AUTO: 27.5 PG (ref 27–31)
MCHC RBC AUTO-ENTMCNC: 32.1 G/DL (ref 32–36)
MCV RBC AUTO: 86 FL (ref 82–98)
MICROSCOPIC COMMENT: ABNORMAL
NITRITE UR QL STRIP: NEGATIVE
NUCLEATED RBC (/100WBC) (OHS): 0 /100 WBC
OHS QRS DURATION: 80 MS
OHS QTC CALCULATION: 401 MS
PH UR STRIP: 8 [PH]
PLATELET # BLD AUTO: 362 K/UL (ref 150–450)
PMV BLD AUTO: 10.8 FL (ref 9.2–12.9)
POTASSIUM SERPL-SCNC: 3.8 MMOL/L (ref 3.5–5.1)
PROT SERPL-MCNC: 6.4 GM/DL (ref 6–8.4)
PROT UR QL STRIP: ABNORMAL
RBC # BLD AUTO: 4.22 M/UL (ref 4–5.4)
RBC #/AREA URNS AUTO: 15 /HPF (ref 0–4)
RELATIVE EOSINOPHIL (OHS): 0.7 %
RELATIVE LYMPHOCYTE (OHS): 16.8 % (ref 18–48)
RELATIVE MONOCYTE (OHS): 5.9 % (ref 4–15)
RELATIVE NEUTROPHIL (OHS): 75.8 % (ref 38–73)
SODIUM SERPL-SCNC: 138 MMOL/L (ref 136–145)
SP GR UR STRIP: 1.03
SQUAMOUS #/AREA URNS AUTO: 7 /HPF
UROBILINOGEN UR STRIP-ACNC: ABNORMAL EU/DL
WBC # BLD AUTO: 8.44 K/UL (ref 3.9–12.7)
WBC #/AREA URNS AUTO: 0 /HPF (ref 0–5)

## 2025-06-22 PROCEDURE — 85025 COMPLETE CBC W/AUTO DIFF WBC: CPT

## 2025-06-22 PROCEDURE — G0378 HOSPITAL OBSERVATION PER HR: HCPCS

## 2025-06-22 PROCEDURE — 99285 EMERGENCY DEPT VISIT HI MDM: CPT | Mod: 25

## 2025-06-22 PROCEDURE — 96361 HYDRATE IV INFUSION ADD-ON: CPT

## 2025-06-22 PROCEDURE — 93010 ELECTROCARDIOGRAM REPORT: CPT | Mod: ,,, | Performed by: INTERNAL MEDICINE

## 2025-06-22 PROCEDURE — 96374 THER/PROPH/DIAG INJ IV PUSH: CPT | Mod: 59

## 2025-06-22 PROCEDURE — 83690 ASSAY OF LIPASE: CPT

## 2025-06-22 PROCEDURE — 25000003 PHARM REV CODE 250

## 2025-06-22 PROCEDURE — 81025 URINE PREGNANCY TEST: CPT

## 2025-06-22 PROCEDURE — 63600175 PHARM REV CODE 636 W HCPCS

## 2025-06-22 PROCEDURE — 87389 HIV-1 AG W/HIV-1&-2 AB AG IA: CPT | Performed by: STUDENT IN AN ORGANIZED HEALTH CARE EDUCATION/TRAINING PROGRAM

## 2025-06-22 PROCEDURE — 81001 URINALYSIS AUTO W/SCOPE: CPT

## 2025-06-22 PROCEDURE — 96375 TX/PRO/DX INJ NEW DRUG ADDON: CPT

## 2025-06-22 PROCEDURE — 25500020 PHARM REV CODE 255: Performed by: EMERGENCY MEDICINE

## 2025-06-22 PROCEDURE — 93005 ELECTROCARDIOGRAM TRACING: CPT

## 2025-06-22 PROCEDURE — 80053 COMPREHEN METABOLIC PANEL: CPT

## 2025-06-22 PROCEDURE — 86803 HEPATITIS C AB TEST: CPT | Performed by: STUDENT IN AN ORGANIZED HEALTH CARE EDUCATION/TRAINING PROGRAM

## 2025-06-22 RX ORDER — KETOROLAC TROMETHAMINE 30 MG/ML
15 INJECTION, SOLUTION INTRAMUSCULAR; INTRAVENOUS EVERY 6 HOURS PRN
Status: DISCONTINUED | OUTPATIENT
Start: 2025-06-22 | End: 2025-06-22 | Stop reason: HOSPADM

## 2025-06-22 RX ORDER — ONDANSETRON HYDROCHLORIDE 2 MG/ML
4 INJECTION, SOLUTION INTRAVENOUS
Status: COMPLETED | OUTPATIENT
Start: 2025-06-22 | End: 2025-06-22

## 2025-06-22 RX ORDER — IBUPROFEN 200 MG
24 TABLET ORAL
Status: DISCONTINUED | OUTPATIENT
Start: 2025-06-22 | End: 2025-06-22 | Stop reason: HOSPADM

## 2025-06-22 RX ORDER — ENOXAPARIN SODIUM 100 MG/ML
40 INJECTION SUBCUTANEOUS EVERY 24 HOURS
Status: DISCONTINUED | OUTPATIENT
Start: 2025-06-22 | End: 2025-06-22 | Stop reason: HOSPADM

## 2025-06-22 RX ORDER — POLYETHYLENE GLYCOL 3350 17 G/17G
17 POWDER, FOR SOLUTION ORAL DAILY PRN
Status: DISCONTINUED | OUTPATIENT
Start: 2025-06-22 | End: 2025-06-22 | Stop reason: HOSPADM

## 2025-06-22 RX ORDER — ACETAMINOPHEN 500 MG
1000 TABLET ORAL EVERY 8 HOURS PRN
Status: DISCONTINUED | OUTPATIENT
Start: 2025-06-22 | End: 2025-06-22 | Stop reason: HOSPADM

## 2025-06-22 RX ORDER — IPRATROPIUM BROMIDE AND ALBUTEROL SULFATE 2.5; .5 MG/3ML; MG/3ML
3 SOLUTION RESPIRATORY (INHALATION) EVERY 6 HOURS PRN
Status: DISCONTINUED | OUTPATIENT
Start: 2025-06-22 | End: 2025-06-22 | Stop reason: HOSPADM

## 2025-06-22 RX ORDER — PROCHLORPERAZINE EDISYLATE 5 MG/ML
5 INJECTION INTRAMUSCULAR; INTRAVENOUS EVERY 6 HOURS PRN
Status: DISCONTINUED | OUTPATIENT
Start: 2025-06-22 | End: 2025-06-22 | Stop reason: HOSPADM

## 2025-06-22 RX ORDER — MORPHINE SULFATE 4 MG/ML
4 INJECTION, SOLUTION INTRAMUSCULAR; INTRAVENOUS
Refills: 0 | Status: COMPLETED | OUTPATIENT
Start: 2025-06-22 | End: 2025-06-22

## 2025-06-22 RX ORDER — NALOXONE HCL 0.4 MG/ML
0.02 VIAL (ML) INJECTION
Status: DISCONTINUED | OUTPATIENT
Start: 2025-06-22 | End: 2025-06-22 | Stop reason: HOSPADM

## 2025-06-22 RX ORDER — IBUPROFEN 200 MG
16 TABLET ORAL
Status: DISCONTINUED | OUTPATIENT
Start: 2025-06-22 | End: 2025-06-22 | Stop reason: HOSPADM

## 2025-06-22 RX ORDER — SODIUM CHLORIDE 0.9 % (FLUSH) 0.9 %
10 SYRINGE (ML) INJECTION EVERY 8 HOURS
Status: DISCONTINUED | OUTPATIENT
Start: 2025-06-22 | End: 2025-06-22 | Stop reason: HOSPADM

## 2025-06-22 RX ORDER — MORPHINE SULFATE 2 MG/ML
2 INJECTION, SOLUTION INTRAMUSCULAR; INTRAVENOUS EVERY 6 HOURS PRN
Status: DISCONTINUED | OUTPATIENT
Start: 2025-06-22 | End: 2025-06-22 | Stop reason: HOSPADM

## 2025-06-22 RX ORDER — ACETAMINOPHEN 325 MG/1
650 TABLET ORAL EVERY 4 HOURS PRN
Status: DISCONTINUED | OUTPATIENT
Start: 2025-06-22 | End: 2025-06-22

## 2025-06-22 RX ORDER — GLUCAGON 1 MG
1 KIT INJECTION
Status: DISCONTINUED | OUTPATIENT
Start: 2025-06-22 | End: 2025-06-22 | Stop reason: HOSPADM

## 2025-06-22 RX ORDER — ONDANSETRON 8 MG/1
8 TABLET, ORALLY DISINTEGRATING ORAL EVERY 8 HOURS PRN
Status: DISCONTINUED | OUTPATIENT
Start: 2025-06-22 | End: 2025-06-22 | Stop reason: HOSPADM

## 2025-06-22 RX ORDER — SIMETHICONE 80 MG
1 TABLET,CHEWABLE ORAL 4 TIMES DAILY PRN
Status: DISCONTINUED | OUTPATIENT
Start: 2025-06-22 | End: 2025-06-22 | Stop reason: HOSPADM

## 2025-06-22 RX ORDER — TALC
6 POWDER (GRAM) TOPICAL NIGHTLY PRN
Status: DISCONTINUED | OUTPATIENT
Start: 2025-06-22 | End: 2025-06-22 | Stop reason: HOSPADM

## 2025-06-22 RX ORDER — ALUMINUM HYDROXIDE, MAGNESIUM HYDROXIDE, AND SIMETHICONE 1200; 120; 1200 MG/30ML; MG/30ML; MG/30ML
30 SUSPENSION ORAL 4 TIMES DAILY PRN
Status: DISCONTINUED | OUTPATIENT
Start: 2025-06-22 | End: 2025-06-22 | Stop reason: HOSPADM

## 2025-06-22 RX ADMIN — SODIUM CHLORIDE 1000 ML: 9 INJECTION, SOLUTION INTRAVENOUS at 04:06

## 2025-06-22 RX ADMIN — ONDANSETRON 4 MG: 2 INJECTION INTRAMUSCULAR; INTRAVENOUS at 04:06

## 2025-06-22 RX ADMIN — MORPHINE SULFATE 4 MG: 4 INJECTION INTRAVENOUS at 04:06

## 2025-06-22 RX ADMIN — IOHEXOL 100 ML: 350 INJECTION, SOLUTION INTRAVENOUS at 04:06

## 2025-06-22 NOTE — SUBJECTIVE & OBJECTIVE
History reviewed. No pertinent past medical history.    History reviewed. No pertinent surgical history.    Review of patient's allergies indicates:  No Known Allergies    No current facility-administered medications on file prior to encounter.     Current Outpatient Medications on File Prior to Encounter   Medication Sig    ESTARYLLA 0.25-0.035 mg per tablet Take 1 tablet by mouth once daily.     Family History    None       Tobacco Use    Smoking status: Unknown    Smokeless tobacco: Not on file   Substance and Sexual Activity    Alcohol use: Not on file    Drug use: Not on file    Sexual activity: Not on file     Review of Systems   Constitutional:  Negative for activity change, chills, diaphoresis, fatigue and fever.   HENT:  Negative for congestion.    Respiratory:  Negative for cough, chest tightness, shortness of breath and wheezing.    Cardiovascular:  Negative for chest pain, palpitations and leg swelling.   Gastrointestinal:  Positive for abdominal pain, nausea and vomiting. Negative for abdominal distention, blood in stool, constipation and diarrhea.   Genitourinary:  Negative for difficulty urinating, dysuria, frequency, hematuria and urgency.   Musculoskeletal:  Positive for back pain. Negative for arthralgias and neck stiffness.   Neurological:  Negative for dizziness, tremors, seizures, syncope, weakness, light-headedness, numbness and headaches.   Psychiatric/Behavioral:  Negative for agitation, confusion and hallucinations.      Objective:     Vital Signs (Most Recent):  Temp: 98 °F (36.7 °C) (06/22/25 1235)  Pulse: (!) 59 (06/22/25 1400)  Resp: 18 (06/22/25 1236)  BP: 113/66 (06/22/25 1300)  SpO2: 99 % (06/22/25 1400) Vital Signs (24h Range):  Temp:  [98 °F (36.7 °C)-98.4 °F (36.9 °C)] 98 °F (36.7 °C)  Pulse:  [56-78] 59  Resp:  [16-20] 18  SpO2:  [95 %-99 %] 99 %  BP: (113-149)/(66-87) 113/66     Weight: 89.9 kg (198 lb 3.1 oz)  Body mass index is 36.25 kg/m².     Physical Exam  Vitals and nursing  note reviewed.   Constitutional:       General: She is not in acute distress.     Appearance: She is well-developed. She is not diaphoretic.   HENT:      Head: Normocephalic and atraumatic.      Right Ear: External ear normal.      Left Ear: External ear normal.      Nose: Nose normal. No congestion.      Mouth/Throat:      Pharynx: Oropharynx is clear.   Eyes:      General: No scleral icterus.     Extraocular Movements: Extraocular movements intact.   Cardiovascular:      Rate and Rhythm: Normal rate and regular rhythm.      Pulses: Normal pulses.      Heart sounds: Normal heart sounds. No murmur heard.  Pulmonary:      Effort: Pulmonary effort is normal. No respiratory distress.      Breath sounds: Normal breath sounds. No wheezing or rales.   Abdominal:      General: Bowel sounds are normal. There is no distension.      Palpations: Abdomen is soft.      Tenderness: There is abdominal tenderness (epigastric). There is no guarding or rebound.   Musculoskeletal:      Cervical back: Normal range of motion.      Right lower leg: No edema.      Left lower leg: No edema.   Skin:     General: Skin is warm and dry.      Capillary Refill: Capillary refill takes less than 2 seconds.   Neurological:      General: No focal deficit present.      Mental Status: She is alert and oriented to person, place, and time. Mental status is at baseline.   Psychiatric:         Mood and Affect: Mood normal.         Behavior: Behavior normal.         Thought Content: Thought content normal.                Significant Labs: All pertinent labs within the past 24 hours have been reviewed.  CBC:   Recent Labs   Lab 06/22/25  9549   WBC 8.44   HGB 11.6*   HCT 36.1*        CMP:   Recent Labs   Lab 06/22/25  0545      K 3.8      CO2 19*   *   BUN 8   CREATININE 0.5   CALCIUM 8.5*   PROT 6.4   ALBUMIN 3.6   BILITOT 1.5*   ALKPHOS 117   *   *   ANIONGAP 9     Lipase:   Recent Labs   Lab 06/22/25  3894   LIPASE  22     Urine Studies:   Recent Labs   Lab 06/22/25  0435   COLORU Yellow   APPEARANCEUA Hazy*   PHUR 8.0   SPECGRAV 1.030   PROTEINUA Trace*   GLUCUA Negative   BILIRUBINUA Negative   OCCULTUA Negative   NITRITE Negative   UROBILINOGEN 4.0-6.0*   LEUKOCYTESUR 1+*   RBCUA 15*   WBCUA 0   BACTERIA Rare       Significant Imaging: I have reviewed all pertinent imaging results/findings within the past 24 hours.  Imaging Results              US Abdomen Limited (Final result)  Result time 06/22/25 11:31:11      Final result by Harinder Lira MD (06/22/25 11:31:11)                   Impression:      Cholelithiasis.  No evidence of cholecystitis or biliary obstruction.    Hepatic steatosis.    Electronically signed by resident: Nicci Cummins  Date:    06/22/2025  Time:    10:55    Electronically signed by: Harinder Liar  Date:    06/22/2025  Time:    11:31               Narrative:    EXAMINATION:  US ABDOMEN LIMITED    CLINICAL HISTORY:  r/o cholecystitis;    TECHNIQUE:  Limited ultrasound of the right upper quadrant of the abdomen focused on the biliary system was performed.    COMPARISON:  Same day CT; abdominal MRI 05/09/2025    FINDINGS:  Pancreas: Visualized portions appear normal.    Gallbladder: Few gallstones measuring up to 2.2 cm.  No gallbladder distension, wall thickening, or pericholecystic fluid.  Negative sonographic Morocho sign.    Biliary system: The common duct measures 6 mm. No intrahepatic ductal dilatation.    Miscellaneous: Echogenic liver parenchyma.  Unremarkable right kidney.  No ascites.                                       CT Abdomen Pelvis With IV Contrast NO Oral Contrast (Final result)  Result time 06/22/25 05:37:31      Final result by Donny Gomes MD (06/22/25 05:37:31)                   Impression:      No acute abnormality in the abdomen and pelvis.    Cholelithiasis.  No evidence of cholecystitis.    Minimal periportal edema.    Splenomegaly.    Electronically signed by resident:  Alexys Payne  Date:    06/22/2025  Time:    05:01    Electronically signed by: Donny Gomes MD  Date:    06/22/2025  Time:    05:37               Narrative:    EXAMINATION:  CT ABDOMEN PELVIS WITH IV CONTRAST    CLINICAL HISTORY:  Nausea/vomiting    TECHNIQUE:  Low dose axial images, sagittal and coronal reformations were obtained from the lung bases to the pubic symphysis following the IV administration of 100 mL of Omnipaque 350 intravenous contrast. Oral contrast was not administered.    COMPARISON:  MRI MRCP 05/09/2025    FINDINGS:  Lungs: Bilateral dependent atelectasis.  No consolidation or pleural effusion in the visualized lung bases.    Heart: Normal size. No pericardial effusion.    Liver: Normal size.  No focal abnormality. Minimal periportal edema.  .    Gallbladder: Nondistended.  Gallstones seen on prior MRCP poorly visualized.  No pericholecystic inflammatory changes.    Bile ducts: No intrahepatic or extrahepatic biliary ductal dilatation.    Spleen: Splenomegaly measuring 12.1 cm AP.    Pancreas: No mass. No peripancreatic fat stranding.    Adrenals: No significant abnormality    Renal/Ureters: The kidneys are normal in size . No stones.  No focal renal abnormalities.  No hydroureteronephrosis. The urinary bladder is unremarkable.    Reproductive: Uterus is without significant abnormality. No adnexal mass.    Stomach/Bowel: Stomach is unremarkable.  Small bowel is normal caliber without obstruction or inflammation. Appendix is within normal limits.  Large bowel is normal caliber without obstruction or inflammation.    Peritoneum: No free fluid. No intraperitoneal free air.    Lymph Nodes: No pathologic jennifer enlargement in the abdomen or pelvis.    Vasculature: Abdominal aorta tapers normally.  No significant atherosclerosis of the abdominal aorta and its branches. Portal vasculature, SMV, and splenic vein are patent.    Bones: No acute fractures or osseous destructive lesions.    Soft  Tissues: No significant abnormality.                        Preliminary result by Alexys Payne MD (06/22/25 05:13:11)                   Impression:      No acute abnormality in the abdomen and pelvis.    Cholelithiasis evidence of cholecystitis.    Splenomegaly.    Electronically signed by resident: Alexys Payne  Date:    06/22/2025  Time:    05:01                 Narrative:    EXAMINATION:  CT ABDOMEN PELVIS WITH IV CONTRAST    CLINICAL HISTORY:  Nausea/vomiting;    TECHNIQUE:  Low dose axial images, sagittal and coronal reformations were obtained from the lung bases to the pubic symphysis following the IV administration of 100 mL of Omnipaque 350 intravenous contrast. Oral contrast was not administered.    COMPARISON:  MRI MRCP 05/09/2025    FINDINGS:  Lungs: Bilateral dependent atelectasis.  No consolidation or pleural effusion in the visualized lung bases.    Heart: Normal size. No pericardial effusion.    Liver: Normal size.  No focal abnormality. .    Gallbladder: Nondistended.  Gallstones seen on prior MRCP poorly visualized.  No pericholecystic inflammatory changes.    Bile ducts: No intrahepatic or extrahepatic biliary ductal dilatation.    Spleen: Splenomegaly measuring 12.1 cm AP.    Pancreas: No mass. No peripancreatic fat stranding.    Adrenals: No significant abnormality    Renal/Ureters: The kidneys are normal in size . No stones.  No focal renal abnormalities.  No hydroureteronephrosis. The urinary bladder is unremarkable.    Reproductive: Uterus is without significant abnormality. No adnexal mass.    Stomach/Bowel: Stomach is unremarkable.  Small bowel is normal caliber without obstruction or inflammation. Appendix is within normal limits.  Large bowel is normal caliber without obstruction or inflammation.    Peritoneum: No free fluid. No intraperitoneal free air.    Lymph Nodes: No pathologic jennifer enlargement in the abdomen or pelvis.    Vasculature: Abdominal aorta tapers normally.   No significant atherosclerosis of the abdominal aorta and its branches. Portal vasculature, SMV, and splenic vein are patent.    Bones: No acute fractures or osseous destructive lesions.    Soft Tissues: No significant abnormality.

## 2025-06-22 NOTE — H&P
[facility]  [facility]  History & Physical    Patient Name: Blanca Acosta  MRN: [id number]  Patient Class: OP- Observation  Admission Date: 6/22/2025  Attending Physician: Bridget Castrejon MD   Primary Care Provider: Unique Cerrato MD         Patient information was obtained from patient, past medical records, and ER records.     Subjective:     Principal Problem:Upper abdominal pain    Chief Complaint:   Chief Complaint   Patient presents with    Flank Pain     R sided flank pain. +N/V         HPI:  Ms. Blanca Acosta is a 28yo female with history of PCOS, obesity, cholelithiasis, gallstone pancreatitis who presents with right upper quadrant pain. She notes she had some abdominal discomfort Wednesday associated w/ meals that subsided w/ tylenol. She reports severe pain began in the evening after eating sushi, similar to biliary colic she has experienced before, however, pain did not subside w/ tylenol and associated with nausea and two episodes of non-bloody non-bilious emesis which she has not had previously. Given this change in her symptoms she presented to the ED for further evaluation. She was initially diagnosed with gallstone pancreatitis in early May at [place] and seen at close interval follow-up in surgery clinic with plan for elective robotic cholecystectomy 7/21 with Dr. Kan. She denies fevers, chills, diarrhea, pale stools, yellowing of skin or eyes, bloody stool, unintentional weight loss. Pain only resolved once she received morphine in the ED.     In ED, Pt AFVSS on RA. No leukocytosis or significant electrolyte abnormalities. , , lipase wnl. UA w/ RBC but noninfectious. CT a/p: No acute abnormality in the abdomen and pelvis. Cholelithiasis.  No evidence of cholecystitis.   Minimal periportal edema. Abd US: Cholelithiasis.  No evidence of cholecystitis or biliary obstruction. Given morphine, IVF and antiemetics. General surgery consulted. Admitted to  for  abdominal pain.    History reviewed. No pertinent past medical history.    History reviewed. No pertinent surgical history.    Review of patient's allergies indicates:  No Known Allergies    No current facility-administered medications on file prior to encounter.     Current Outpatient Medications on File Prior to Encounter   Medication Sig    ESTARYLLA 0.25-0.035 mg per tablet Take 1 tablet by mouth once daily.     Family History    None       Tobacco Use    Smoking status: Unknown    Smokeless tobacco: Not on file   Substance and Sexual Activity    Alcohol use: Not on file    Drug use: Not on file    Sexual activity: Not on file     Review of Systems   Constitutional:  Negative for activity change, chills, diaphoresis, fatigue and fever.   HENT:  Negative for congestion.    Respiratory:  Negative for cough, chest tightness, shortness of breath and wheezing.    Cardiovascular:  Negative for chest pain, palpitations and leg swelling.   Gastrointestinal:  Positive for abdominal pain, nausea and vomiting. Negative for abdominal distention, blood in stool, constipation and diarrhea.   Genitourinary:  Negative for difficulty urinating, dysuria, frequency, hematuria and urgency.   Musculoskeletal:  Positive for back pain. Negative for arthralgias and neck stiffness.   Neurological:  Negative for dizziness, tremors, seizures, syncope, weakness, light-headedness, numbness and headaches.   Psychiatric/Behavioral:  Negative for agitation, confusion and hallucinations.      Objective:     Vital Signs (Most Recent):  Temp: 98 °F (36.7 °C) (06/22/25 1235)  Pulse: (!) 59 (06/22/25 1400)  Resp: 18 (06/22/25 1236)  BP: 113/66 (06/22/25 1300)  SpO2: 99 % (06/22/25 1400) Vital Signs (24h Range):  Temp:  [98 °F (36.7 °C)-98.4 °F (36.9 °C)] 98 °F (36.7 °C)  Pulse:  [56-78] 59  Resp:  [16-20] 18  SpO2:  [95 %-99 %] 99 %  BP: (113-149)/(66-87) 113/66     Weight: 89.9 kg (198 lb 3.1 oz)  Body mass index is 36.25 kg/m².     Physical  Exam  Vitals and nursing note reviewed.   Constitutional:       General: She is not in acute distress.     Appearance: She is well-developed. She is not diaphoretic.   HENT:      Head: Normocephalic and atraumatic.      Right Ear: External ear normal.      Left Ear: External ear normal.      Nose: Nose normal. No congestion.      Mouth/Throat:      Pharynx: Oropharynx is clear.   Eyes:      General: No scleral icterus.     Extraocular Movements: Extraocular movements intact.   Cardiovascular:      Rate and Rhythm: Normal rate and regular rhythm.      Pulses: Normal pulses.      Heart sounds: Normal heart sounds. No murmur heard.  Pulmonary:      Effort: Pulmonary effort is normal. No respiratory distress.      Breath sounds: Normal breath sounds. No wheezing or rales.   Abdominal:      General: Bowel sounds are normal. There is no distension.      Palpations: Abdomen is soft.      Tenderness: There is abdominal tenderness (epigastric). There is no guarding or rebound.   Musculoskeletal:      Cervical back: Normal range of motion.      Right lower leg: No edema.      Left lower leg: No edema.   Skin:     General: Skin is warm and dry.      Capillary Refill: Capillary refill takes less than 2 seconds.   Neurological:      General: No focal deficit present.      Mental Status: She is alert and oriented to person, place, and time. Mental status is at baseline.   Psychiatric:         Mood and Affect: Mood normal.         Behavior: Behavior normal.         Thought Content: Thought content normal.                Significant Labs: All pertinent labs within the past 24 hours have been reviewed.  CBC:   Recent Labs   Lab 06/22/25  0435   WBC 8.44   HGB 11.6*   HCT 36.1*        CMP:   Recent Labs   Lab 06/22/25  0545      K 3.8      CO2 19*   *   BUN 8   CREATININE 0.5   CALCIUM 8.5*   PROT 6.4   ALBUMIN 3.6   BILITOT 1.5*   ALKPHOS 117   *   *   ANIONGAP 9     Lipase:   Recent Labs    Lab 06/22/25  0435   LIPASE 22     Urine Studies:   Recent Labs   Lab 06/22/25 0435   COLORU Yellow   APPEARANCEUA Hazy*   PHUR 8.0   SPECGRAV 1.030   PROTEINUA Trace*   GLUCUA Negative   BILIRUBINUA Negative   OCCULTUA Negative   NITRITE Negative   UROBILINOGEN 4.0-6.0*   LEUKOCYTESUR 1+*   RBCUA 15*   WBCUA 0   BACTERIA Rare       Significant Imaging: I have reviewed all pertinent imaging results/findings within the past 24 hours.  Imaging Results              US Abdomen Limited (Final result)  Result time 06/22/25 11:31:11      Final result by Harinder Lira MD (06/22/25 11:31:11)                   Impression:      Cholelithiasis.  No evidence of cholecystitis or biliary obstruction.    Hepatic steatosis.    Electronically signed by resident: Nicci Cummins  Date:    06/22/2025  Time:    10:55    Electronically signed by: Harinder Lira  Date:    06/22/2025  Time:    11:31               Narrative:    EXAMINATION:  US ABDOMEN LIMITED    CLINICAL HISTORY:  r/o cholecystitis;    TECHNIQUE:  Limited ultrasound of the right upper quadrant of the abdomen focused on the biliary system was performed.    COMPARISON:  Same day CT; abdominal MRI 05/09/2025    FINDINGS:  Pancreas: Visualized portions appear normal.    Gallbladder: Few gallstones measuring up to 2.2 cm.  No gallbladder distension, wall thickening, or pericholecystic fluid.  Negative sonographic Morocho sign.    Biliary system: The common duct measures 6 mm. No intrahepatic ductal dilatation.    Miscellaneous: Echogenic liver parenchyma.  Unremarkable right kidney.  No ascites.                                       CT Abdomen Pelvis With IV Contrast NO Oral Contrast (Final result)  Result time 06/22/25 05:37:31      Final result by Donny Gomes MD (06/22/25 05:37:31)                   Impression:      No acute abnormality in the abdomen and pelvis.    Cholelithiasis.  No evidence of cholecystitis.    Minimal periportal  edema.    Splenomegaly.    Electronically signed by resident: Alexys Payne  Date:    06/22/2025  Time:    05:01    Electronically signed by: Donny Gomes MD  Date:    06/22/2025  Time:    05:37               Narrative:    EXAMINATION:  CT ABDOMEN PELVIS WITH IV CONTRAST    CLINICAL HISTORY:  Nausea/vomiting    TECHNIQUE:  Low dose axial images, sagittal and coronal reformations were obtained from the lung bases to the pubic symphysis following the IV administration of 100 mL of Omnipaque 350 intravenous contrast. Oral contrast was not administered.    COMPARISON:  MRI MRCP 05/09/2025    FINDINGS:  Lungs: Bilateral dependent atelectasis.  No consolidation or pleural effusion in the visualized lung bases.    Heart: Normal size. No pericardial effusion.    Liver: Normal size.  No focal abnormality. Minimal periportal edema.  .    Gallbladder: Nondistended.  Gallstones seen on prior MRCP poorly visualized.  No pericholecystic inflammatory changes.    Bile ducts: No intrahepatic or extrahepatic biliary ductal dilatation.    Spleen: Splenomegaly measuring 12.1 cm AP.    Pancreas: No mass. No peripancreatic fat stranding.    Adrenals: No significant abnormality    Renal/Ureters: The kidneys are normal in size . No stones.  No focal renal abnormalities.  No hydroureteronephrosis. The urinary bladder is unremarkable.    Reproductive: Uterus is without significant abnormality. No adnexal mass.    Stomach/Bowel: Stomach is unremarkable.  Small bowel is normal caliber without obstruction or inflammation. Appendix is within normal limits.  Large bowel is normal caliber without obstruction or inflammation.    Peritoneum: No free fluid. No intraperitoneal free air.    Lymph Nodes: No pathologic jennifer enlargement in the abdomen or pelvis.    Vasculature: Abdominal aorta tapers normally.  No significant atherosclerosis of the abdominal aorta and its branches. Portal vasculature, SMV, and splenic vein are patent.    Bones:  No acute fractures or osseous destructive lesions.    Soft Tissues: No significant abnormality.                        Preliminary result by Alexys Payne MD (06/22/25 05:13:11)                   Impression:      No acute abnormality in the abdomen and pelvis.    Cholelithiasis evidence of cholecystitis.    Splenomegaly.    Electronically signed by resident: Alexys Payne  Date:    06/22/2025  Time:    05:01                 Narrative:    EXAMINATION:  CT ABDOMEN PELVIS WITH IV CONTRAST    CLINICAL HISTORY:  Nausea/vomiting;    TECHNIQUE:  Low dose axial images, sagittal and coronal reformations were obtained from the lung bases to the pubic symphysis following the IV administration of 100 mL of Omnipaque 350 intravenous contrast. Oral contrast was not administered.    COMPARISON:  MRI MRCP 05/09/2025    FINDINGS:  Lungs: Bilateral dependent atelectasis.  No consolidation or pleural effusion in the visualized lung bases.    Heart: Normal size. No pericardial effusion.    Liver: Normal size.  No focal abnormality. .    Gallbladder: Nondistended.  Gallstones seen on prior MRCP poorly visualized.  No pericholecystic inflammatory changes.    Bile ducts: No intrahepatic or extrahepatic biliary ductal dilatation.    Spleen: Splenomegaly measuring 12.1 cm AP.    Pancreas: No mass. No peripancreatic fat stranding.    Adrenals: No significant abnormality    Renal/Ureters: The kidneys are normal in size . No stones.  No focal renal abnormalities.  No hydroureteronephrosis. The urinary bladder is unremarkable.    Reproductive: Uterus is without significant abnormality. No adnexal mass.    Stomach/Bowel: Stomach is unremarkable.  Small bowel is normal caliber without obstruction or inflammation. Appendix is within normal limits.  Large bowel is normal caliber without obstruction or inflammation.    Peritoneum: No free fluid. No intraperitoneal free air.    Lymph Nodes: No pathologic jennifer enlargement in the abdomen  "or pelvis.    Vasculature: Abdominal aorta tapers normally.  No significant atherosclerosis of the abdominal aorta and its branches. Portal vasculature, SMV, and splenic vein are patent.    Bones: No acute fractures or osseous destructive lesions.    Soft Tissues: No significant abnormality.                                     Assessment/Plan:     Assessment & Plan  Upper abdominal pain  Flank pain  Calculus of gallbladder without cholecystitis  - AFVSS on RA.   - No leukocytosis or significant electrolyte abnormalities.   - , , lipase wnl.  - UA w/ RBC but noninfectious.  - CT a/p: No acute abnormality in the abdomen and pelvis. Cholelithiasis.  No evidence of cholecystitis.   Minimal periportal edema.   - Abd US: Cholelithiasis.  No evidence of cholecystitis or biliary obstruction.   - s/p morphine, continue mm pain regimen  - s/p 1 L NS bolus  - prn antiemetics  - general surgery consulted  "Symptoms may represent possible choledocholithiasis vs Mirizzi syndrome vs early acute cholecystitis, will follow-up imaging results to guide next steps."  - F/u RUQUS and MRCP  - NPO for now  - No antibiotics indicated from surgery standpoint  - Pain and nausea meds per ED  - Dispo pending imaging results  PCOS (polycystic ovarian syndrome)  - hx noted, uncomplicated  Hepatic steatosis  - noted  VTE Risk Mitigation (From admission, onward)           Ordered     enoxaparin injection 40 mg  Daily         06/22/25 1159     IP VTE HIGH RISK PATIENT  Once         06/22/25 1159     Place sequential compression device  Until discontinued         06/22/25 1159                    SDOH Screening:  The patient was screened for utility difficulties, food insecurity, transport difficulties, housing insecurity, and interpersonal safety and there were no concerns identified this admission.                   On 06/22/2025, patient should be placed in hospital observation services under my care in collaboration with  " Cash.           Miguel Nash PA-C  Department of Hospital Medicine  [facility] - Emergency Dept

## 2025-06-22 NOTE — ASSESSMENT & PLAN NOTE
30yo female with hx of PCOS, HLD, symptomatic cholelithiasis, gallstone pancreatitis presenting with intractable RUQ pain. Afebrile and stable, non-tender, pain resolved with morphine. Labs notable for normal WBC count with slight left shift, elevated liver enzymes, normal lipase. CT without evidence of acute cholecystitis, pending ultrasound and MRCP. Symptoms may represent possible choledocholithiasis vs Mirizzi syndrome vs early acute cholecystitis, will follow-up imaging results to guide next steps.    - F/u RUQUS and MRCP  - NPO for now  - No antibiotics indicated from surgery standpoint  - Pain and nausea meds per ED  - Dispo pending imaging results

## 2025-06-22 NOTE — HPI
Ms. Blanca Acosta is a 30yo female with history of PCOS, obesity, cholelithiasis, gallstone pancreatitis who presents with right upper quadrant pain. She notes she had some abdominal discomfort Wednesday associated w/ meals that subsided w/ tylenol. She reports severe pain began in the evening after eating sushi, similar to biliary colic she has experienced before, however, pain did not subside w/ tylenol and associated with nausea and two episodes of non-bloody non-bilious emesis which she has not had previously. Given this change in her symptoms she presented to the ED for further evaluation. She was initially diagnosed with gallstone pancreatitis in early May at [place] and seen at close interval follow-up in surgery clinic with plan for elective robotic cholecystectomy 7/21 with Dr. Kan. She denies fevers, chills, diarrhea, pale stools, yellowing of skin or eyes, bloody stool, unintentional weight loss. Pain only resolved once she received morphine in the ED.     In ED, Pt AFVSS on RA. No leukocytosis or significant electrolyte abnormalities. , , lipase wnl. UA w/ RBC but noninfectious. CT a/p: No acute abnormality in the abdomen and pelvis. Cholelithiasis.  No evidence of cholecystitis.   Minimal periportal edema. Abd US: Cholelithiasis.  No evidence of cholecystitis or biliary obstruction. Given morphine, IVF and antiemetics. General surgery consulted. Admitted to  for abdominal pain.

## 2025-06-22 NOTE — ASSESSMENT & PLAN NOTE
"- AFVSS on RA.   - No leukocytosis or significant electrolyte abnormalities.   - , , lipase wnl.  - UA w/ RBC but noninfectious.  - CT a/p: No acute abnormality in the abdomen and pelvis. Cholelithiasis.  No evidence of cholecystitis.   Minimal periportal edema.   - Abd US: Cholelithiasis.  No evidence of cholecystitis or biliary obstruction.   - s/p morphine, continue mm pain regimen  - s/p 1 L NS bolus  - prn antiemetics  - general surgery consulted  "Symptoms may represent possible choledocholithiasis vs Mirizzi syndrome vs early acute cholecystitis, will follow-up imaging results to guide next steps."  - F/u RUQUS and MRCP  - NPO for now  - No antibiotics indicated from surgery standpoint  - Pain and nausea meds per ED  - Dispo pending imaging results  "

## 2025-06-22 NOTE — CONSULTS
[facility] - Emergency Dept  General Surgery  Consult Note    Patient Name: Blanca Acosta  MRN: [id number]  Code Status: Prior  Admission Date: 6/22/2025  Hospital Length of Stay: 0 days  Attending Physician: Zhen Marie MD  Primary Care Provider: Unique Cerrato MD    Patient information was obtained from patient, relative(s), and ER records.     Inpatient consult to General surgery  Consult performed by: Judah Peacock MD  Consult ordered by: Val Barlow MD        Subjective:     Principal Problem: Symptomatic cholelithiasis    History of Present Illness: Ms. Acosta is a 28yo female with history of PCOS, obesity, cholelithiasis, gallstone pancreatitis who presents with right upper quadrant pain. She reports pain began in the evening after eating sushi, similar to biliary colic she has experienced before, however, pain did not subside and associated with nausea and two episodes of non-bloody non-bilious emesis which she has not had previously. Given this change in her symptoms she presented to the ED for further evaluation. She was initially diagnosed with gallstone pancreatitis in early May at [place] and seen at close interval follow-up in surgery clinic with plan for elective robotic cholecystectomy 7/21 with Dr. Kan. She denies fevers, chills, diarrhea, pale stools, yellowing of skin or eyes, bloody stool, unintentional weight loss. Pain only resolved once she received morphine in the ED.     No current facility-administered medications on file prior to encounter.     Current Outpatient Medications on File Prior to Encounter   Medication Sig    ESTARYLLA 0.25-0.035 mg per tablet Take 1 tablet by mouth once daily.       Review of patient's allergies indicates:  No Known Allergies    History reviewed. No pertinent past medical history.  History reviewed. No pertinent surgical history.  Family History    None       Tobacco Use    Smoking status: Unknown    Smokeless tobacco: Not on file   Substance  and Sexual Activity    Alcohol use: Not on file    Drug use: Not on file    Sexual activity: Not on file     Review of Systems   All other systems reviewed and are negative.    Objective:     Vital Signs (Most Recent):  Temp: 98.4 °F (36.9 °C) (06/22/25 0334)  Pulse: 60 (06/22/25 0700)  Resp: 19 (06/22/25 0700)  BP: 127/81 (06/22/25 0700)  SpO2: 96 % (06/22/25 0334) Vital Signs (24h Range):  Temp:  [98.4 °F (36.9 °C)] 98.4 °F (36.9 °C)  Pulse:  [60-78] 60  Resp:  [16-20] 19  SpO2:  [96 %] 96 %  BP: (113-149)/(66-87) 127/81     Weight: 89.9 kg (198 lb 3.1 oz)  Body mass index is 36.25 kg/m².     Physical Exam  Vitals and nursing note reviewed.   Constitutional:       General: She is not in acute distress.     Appearance: Normal appearance. She is obese. She is not toxic-appearing.   HENT:      Head: Normocephalic.      Nose: Nose normal.      Mouth/Throat:      Mouth: Mucous membranes are moist.   Eyes:      General: No scleral icterus.     Conjunctiva/sclera: Conjunctivae normal.   Cardiovascular:      Rate and Rhythm: Normal rate.   Pulmonary:      Effort: Pulmonary effort is normal. No respiratory distress.   Abdominal:      Comments: Obese, soft, no surgical scars, (-) Morocho sign, non-tender throughout    Musculoskeletal:         General: No swelling.   Skin:     General: Skin is warm and dry.   Neurological:      General: No focal deficit present.      Mental Status: She is alert and oriented to person, place, and time.   Psychiatric:         Mood and Affect: Mood normal.         Behavior: Behavior normal.         Thought Content: Thought content normal.            I have reviewed all pertinent lab results within the past 24 hours.  CBC:   Recent Labs   Lab 06/22/25  0435   WBC 8.44   RBC 4.22   HGB 11.6*   HCT 36.1*      MCV 86   MCH 27.5   MCHC 32.1     CMP:   Recent Labs   Lab 06/22/25  0545   *   CALCIUM 8.5*   ALBUMIN 3.6   PROT 6.4      K 3.8   CO2 19*      BUN 8   CREATININE 0.5    ALKPHOS 117   *   *   BILITOT 1.5*     Lipase: 22    Significant Diagnostics:    CT ABDOMEN PELVIS WITH IV CONTRAST      COMPARISON:  MRI MRCP 05/09/2025     FINDINGS:  Lungs: Bilateral dependent atelectasis.  No consolidation or pleural effusion in the visualized lung bases.     Heart: Normal size. No pericardial effusion.     Liver: Normal size.  No focal abnormality. Minimal periportal edema.  .     Gallbladder: Nondistended.  Gallstones seen on prior MRCP poorly visualized.  No pericholecystic inflammatory changes.     Bile ducts: No intrahepatic or extrahepatic biliary ductal dilatation.     Spleen: Splenomegaly measuring 12.1 cm AP.     Pancreas: No mass. No peripancreatic fat stranding.     Adrenals: No significant abnormality     Renal/Ureters: The kidneys are normal in size . No stones.  No focal renal abnormalities.  No hydroureteronephrosis. The urinary bladder is unremarkable.     Reproductive: Uterus is without significant abnormality. No adnexal mass.     Stomach/Bowel: Stomach is unremarkable.  Small bowel is normal caliber without obstruction or inflammation. Appendix is within normal limits.  Large bowel is normal caliber without obstruction or inflammation.     Peritoneum: No free fluid. No intraperitoneal free air.     Lymph Nodes: No pathologic jennifer enlargement in the abdomen or pelvis.     Vasculature: Abdominal aorta tapers normally.  No significant atherosclerosis of the abdominal aorta and its branches. Portal vasculature, SMV, and splenic vein are patent.     Bones: No acute fractures or osseous destructive lesions.     Soft Tissues: No significant abnormality.     Impression:     No acute abnormality in the abdomen and pelvis.     Cholelithiasis.  No evidence of cholecystitis.     Minimal periportal edema.     Splenomegaly.    Assessment/Plan:     Upper abdominal pain  28yo female with hx of PCOS, HLD, symptomatic cholelithiasis, gallstone pancreatitis presenting with  intractable RUQ pain. Afebrile and stable, non-tender, pain resolved with morphine. Labs notable for normal WBC count with slight left shift, elevated liver enzymes, normal lipase. CT without evidence of acute cholecystitis, pending ultrasound and MRCP. Symptoms may represent possible choledocholithiasis vs Mirizzi syndrome vs early acute cholecystitis, will follow-up imaging results to guide next steps.    - F/u RUQUS and MRCP  - NPO for now  - No antibiotics indicated from surgery standpoint  - Pain and nausea meds per ED  - Dispo pending imaging results      VTE Risk Mitigation (From admission, onward)      None            Thank you for your consult. I will follow-up with patient. Please contact us if you have any additional questions.    Judah Peacock MD  General Surgery  [facility] - Emergency Dept

## 2025-06-22 NOTE — PHARMACY MED REC
"  Admission Medication History     The home medication history was taken by Yoly Santos.    You may go to "Admission" then "Reconcile Home Medications" tabs to review and/or act upon these items.     The home medication list has been updated by the Pharmacy department.   Please read ALL comments highlighted in yellow.   Please address this information as you see fit.    Feel free to contact us if you have any questions or require assistance.      Medications listed below were obtained from: Patient/family and Analytic software- [employer]    Current Outpatient Medications on File Prior to Encounter   Medication Sig    ESTARYLLA 0.25-0.035 mg per tablet Take 1 tablet by mouth once daily.           Yoly Santos  EXT [id number]                .          "

## 2025-06-22 NOTE — PROVIDER PROGRESS NOTES - EMERGENCY DEPT.
Encounter Date: 6/22/2025    ED Physician Progress Notes          I assumed care of this patient with the previous ED team.  The time of sign-out she was pending results of her CMP and disposition.  Briefly, she is 28 yo female with hx of PCOS, HLD, symptomatic cholelithiasis, gallstone pancreatitis presenting with intractable RUQ pain. Afebrile and stable, non-tender, pain resolved with morphine. Labs notable for normal WBC count with slight left shift, T bili 1.5, elevated liver enzymes, normal lipase. CT without evidence of acute cholecystitis.  I discussed the case with General surgery and ordered an ultrasound of the right upper quadrant.  On my evaluation the patient reported resolution of her pain.  Her presenting symptoms may represent possible choledocholithiasis vs Mirizzi syndrome vs early acute cholecystitis. Originally scheduled for cholecystectomy in July. Evaluated by Gen Surg. She is admitted to  for MRCP with AES consult for choledocholithias and Gen Surg to follow.

## 2025-06-22 NOTE — HPI
Ms. Acosta is a 28yo female with history of PCOS, obesity, cholelithiasis, gallstone pancreatitis who presents with right upper quadrant pain. She reports pain began in the evening after eating sushi, similar to biliary colic she has experienced before, however, pain did not subside and associated with nausea and two episodes of non-bloody non-bilious emesis which she has not had previously. Given this change in her symptoms she presented to the ED for further evaluation. She was initially diagnosed with gallstone pancreatitis in early May at [place] and seen at close interval follow-up in surgery clinic with plan for elective robotic cholecystectomy 7/21 with Dr. Kan. She denies fevers, chills, diarrhea, pale stools, yellowing of skin or eyes, bloody stool, unintentional weight loss. Pain only resolved once she received morphine in the ED.

## 2025-06-22 NOTE — HOSPITAL COURSE
Pt was admitted for evaluation for abdominal pain, likely d/t cholelithiasis. Pt HDS and afebrile. Elevated LFTs on labs. CT a/p: No acute abnormality in the abdomen and pelvis. Cholelithiasis.  No evidence of cholecystitis.   Minimal periportal edema. Abd US: Cholelithiasis.  No evidence of cholecystitis or biliary obstruction. S/p morphine, continue mm pain regimen General surgery evaluated w/ plans for MRCP, however Pt left AMA after being advised against it.  Pt educated on hospital course and plan, verbally agrees and understands. All questions answered.     Physical Exam  Gen: in NAD, appears stated age  Neuro: AAOx3, motor, sensory, and strength grossly intact BL  HEENT: EOMI, PERRLA; no JVD appreciated  CVS: RRR, no m/r/g  Resp: lungs CTAB, no w/r/r; no belabored breathing or accessory muscle use appreciated   Abd: tenderness, soft to palpation  Extrem: no UE or LE edema BL

## 2025-06-22 NOTE — DISCHARGE SUMMARY
[facility] - Emergency Dept  [facility] Medicine  Discharge Summary      Patient Name: Blanca Acosta  MRN: [id number]  ROLA: [id number]  Patient Class: OP- Observation  Admission Date: 6/22/2025  Hospital Length of Stay: 0 days  Discharge Date and Time: 6/22/2025  4:19 PM  Attending Physician: Bridget Castrejon MD  Discharging Provider: Miguel Nash PA-C  Primary Care Provider: Unique Cerrato MD  [facility] Medicine Team: [facility] MED BB Miguel Nash PA-C  Primary Care Team: [facility] BB    HPI:    Ms. Blanca Acosta is a 30yo female with history of PCOS, obesity, cholelithiasis, gallstone pancreatitis who presents with right upper quadrant pain. She notes she had some abdominal discomfort Wednesday associated w/ meals that subsided w/ tylenol. She reports severe pain began in the evening after eating sushi, similar to biliary colic she has experienced before, however, pain did not subside w/ tylenol and associated with nausea and two episodes of non-bloody non-bilious emesis which she has not had previously. Given this change in her symptoms she presented to the ED for further evaluation. She was initially diagnosed with gallstone pancreatitis in early May at [place] and seen at close interval follow-up in surgery clinic with plan for elective robotic cholecystectomy 7/21 with Dr. Kan. She denies fevers, chills, diarrhea, pale stools, yellowing of skin or eyes, bloody stool, unintentional weight loss. Pain only resolved once she received morphine in the ED.     In ED, Pt AFVSS on RA. No leukocytosis or significant electrolyte abnormalities. , , lipase wnl. UA w/ RBC but noninfectious. CT a/p: No acute abnormality in the abdomen and pelvis. Cholelithiasis.  No evidence of cholecystitis.   Minimal periportal edema. Abd US: Cholelithiasis.  No evidence of cholecystitis or biliary obstruction. Given morphine, IVF and antiemetics. General surgery consulted. Admitted to  for abdominal  "pain.    * No surgery found *      Hospital Course:   Pt was admitted for evaluation for abdominal pain, likely d/t cholelithiasis. Pt HDS and afebrile. Elevated LFTs on labs. CT a/p: No acute abnormality in the abdomen and pelvis. Cholelithiasis.  No evidence of cholecystitis.   Minimal periportal edema. Abd US: Cholelithiasis.  No evidence of cholecystitis or biliary obstruction. S/p morphine, continue mm pain regimen General surgery evaluated w/ plans for MRCP, however Pt left AMA after being advised against it.  Pt educated on hospital course and plan, verbally agrees and understands. All questions answered.     Physical Exam  Gen: in NAD, appears stated age  Neuro: AAOx3, motor, sensory, and strength grossly intact BL  HEENT: EOMI, PERRLA; no JVD appreciated  CVS: RRR, no m/r/g  Resp: lungs CTAB, no w/r/r; no belabored breathing or accessory muscle use appreciated   Abd: tenderness, soft to palpation  Extrem: no UE or LE edema BL      Goals of Care Treatment Preferences:  Code Status: Full Code         Consults:   Consults (From admission, onward)          Status Ordering Provider     Inpatient consult to General surgery  Once        Provider:  (Not yet assigned)    Completed KASH AUGUSTINE            Assessment & Plan  Upper abdominal pain  Flank pain  Calculus of gallbladder without cholecystitis  - AFVSS on RA.   - No leukocytosis or significant electrolyte abnormalities.   - , , lipase wnl.  - UA w/ RBC but noninfectious.  - CT a/p: No acute abnormality in the abdomen and pelvis. Cholelithiasis.  No evidence of cholecystitis.   Minimal periportal edema.   - Abd US: Cholelithiasis.  No evidence of cholecystitis or biliary obstruction.   - s/p morphine, continue mm pain regimen  - s/p 1 L NS bolus  - prn antiemetics  - general surgery consulted  "Symptoms may represent possible choledocholithiasis vs Mirizzi syndrome vs early acute cholecystitis, will follow-up imaging results to guide next " "steps."  - F/u RUQUS and MRCP  - NPO for now  - No antibiotics indicated from surgery standpoint  - Pain and nausea meds per ED  - Dispo pending imaging results  PCOS (polycystic ovarian syndrome)  - hx noted, uncomplicated  Hepatic steatosis  - noted  Final Active Diagnoses:    Diagnosis Date Noted POA    PRINCIPAL PROBLEM:  Upper abdominal pain [R10.10] 06/10/2025 Yes    Flank pain [R10.9] 06/22/2025 Yes    Hepatic steatosis [K76.0] 05/09/2025 Yes    Calculus of gallbladder without cholecystitis [K80.20] 05/09/2025 Yes    PCOS (polycystic ovarian syndrome) [E28.2] 06/28/2024 Yes      Problems Resolved During this Admission:       Discharged Condition: good    Disposition: Left Against Medical Adv*    Follow Up:    Patient Instructions:   No discharge procedures on file.    Significant Diagnostic Studies: Labs: All labs within the past 24 hours have been reviewed    Pending Diagnostic Studies:       None           Medications:  Reconciled Home Medications:      Medication List        ASK your doctor about these medications      ESTARYLLA 0.25-0.035 mg per tablet  Generic drug: norgestimate-ethinyl estradioL  Take 1 tablet by mouth once daily.              Indwelling Lines/Drains at time of discharge:   Lines/Drains/Airways       None                       Time spent on the discharge of patient: 36 minutes         Miguel Nash PA-C  Department of Hospital Medicine  [facility] - Emergency Dept  "

## 2025-06-22 NOTE — PROVIDER PROGRESS NOTES - EMERGENCY DEPT.
Encounter Date: 6/22/2025    ED Physician Progress Notes          EKG: Sinus bradycardia, rate 49, no STEMI

## 2025-06-22 NOTE — PLAN OF CARE
[facility] - [facility] Dept  Initial Discharge Assessment       Primary Care Provider: Unique Cerrato MD    Admission Diagnosis: Flank pain [R10.9]    Admission Date: 6/22/2025  Expected Discharge Date: 6/26/2025    Pt stated she is independent with her ambulation and does not require assistance or equipment.    Pt to d/c home with no needs when ready     Transition of Care Barriers: (P) None    Payor: BLUE [employer]IELD / Plan: [employer] [place] / Product Type: Commercial /     Extended Emergency Contact Information  Primary Emergency Contact: Elizabeth Fuller  Mobile Phone: [phone]  Relation: Mother  Secondary Emergency Contact: Toby Acosta  Mobile Phone: [phone]  Relation: Brother    Discharge Plan A: (P) Home, Home with family  Discharge Plan B: (P) Home      [id number] - [place] - [address] AT [facility] [address]  [address] [address]  Phone: [phone] Fax: [phone]      Initial Assessment (most recent)       Adult Discharge Assessment - 06/22/25 1219          Discharge Assessment    Assessment Type Discharge Planning Assessment (P)      Confirmed/corrected address, phone number and insurance Yes (P)      Confirmed Demographics Correct on Facesheet (P)      Source of Information patient (P)      Communicated VANESSA with patient/caregiver Yes (P)      People in Home parent(s) (P)      Name(s) of People in Home mother Elizabeth (P)      Facility Arrived From: home (P)      Do you expect to return to your current living situation? Yes (P)      Do you have help at home or someone to help you manage your care at home? No (P)      Prior to hospitilization cognitive status: Alert/Oriented;No Deficits (P)      Current cognitive status: No Deficits;Alert/Oriented (P)      Walking or Climbing Stairs Difficulty no (P)      Dressing/Bathing Difficulty no (P)      Home Accessibility wheelchair accessible (P)      Home Layout Able to live on 1st floor (P)       Equipment Currently Used at Home none (P)      Readmission within 30 days? No (P)      Patient currently being followed by outpatient case management? No (P)      Do you currently have service(s) that help you manage your care at home? No (P)      Do you take prescription medications? Yes (P)      Do you have prescription coverage? Yes (P)      Do you have any problems affording any of your prescribed medications? No (P)      Is the patient taking medications as prescribed? yes (P)      Who is going to help you get home at discharge? family/friends (P)      How do you get to doctors appointments? car, drives self (P)      Are you on dialysis? No (P)      Do you take coumadin? No (P)      Discharge Plan A Home;Home with family (P)      Discharge Plan B Home (P)      DME Needed Upon Discharge  none (P)      Discharge Plan discussed with: Patient (P)      Transition of Care Barriers None (P)         Physical Activity    On average, how many days per week do you engage in moderate to strenuous exercise (like a brisk walk)? 0 days (P)      On average, how many minutes do you engage in exercise at this level? 0 min (P)         Financial Resource Strain    How hard is it for you to pay for the very basics like food, housing, medical care, and heating? Not very hard (P)         Housing Stability    In the last 12 months, was there a time when you were not able to pay the mortgage or rent on time? No (P)      At any time in the past 12 months, were you homeless or living in a shelter (including now)? No (P)         Transportation Needs    In the past 12 months, has lack of transportation kept you from medical appointments or from getting medications? No (P)      In the past 12 months, has lack of transportation kept you from meetings, work, or from getting things needed for daily living? No (P)         Food Insecurity    Within the past 12 months, you worried that your food would run out before you got the money to buy  more. Never true (P)      Within the past 12 months, the food you bought just didn't last and you didn't have money to get more. Never true (P)         Stress    Do you feel stress - tense, restless, nervous, or anxious, or unable to sleep at night because your mind is troubled all the time - these days? Only a little (P)         Social Isolation    How often do you feel lonely or isolated from those around you?  Never (P)         Alcohol Use    Q1: How often do you have a drink containing alcohol? Monthly or less (P)      Q2: How many drinks containing alcohol do you have on a typical day when you are drinking? 1 or 2 (P)      Q3: How often do you have six or more drinks on one occasion? Never (P)         Utilities    In the past 12 months has the electric, gas, oil, or water company threatened to shut off services in your home? No (P)         Health Literacy    How often do you need to have someone help you when you read instructions, pamphlets, or other written material from your doctor or pharmacy? Rarely (P)                    Discharge Plan A and Plan B have been determined by review of patient's clinical status, future medical and therapeutic needs, and coverage/benefits for post-acute care in coordination with multidisciplinary team members.    Ramona Duenas CD, MSW, LMSW, RSW   Case Management  [facility]  Email: [email or web address]

## 2025-06-22 NOTE — ED PROVIDER NOTES
Encounter Date: 6/22/2025       History     Chief Complaint   Patient presents with    Flank Pain     R sided flank pain. +N/V      HPI  29-year-old female history of cholelithiasis presents to the emergency department today for chief complaint of right upper quadrant pain.  Denies fevers chills endorses nausea vomiting.  She had pancreatitis recently, she has an elective cholecystectomy scheduled for next month.  She is concerned that she is having another gallbladder attack.  Denies falls or trauma.        Review of patient's allergies indicates:  No Known Allergies  History reviewed. No pertinent past medical history.  History reviewed. No pertinent surgical history.  No family history on file.  Social History[1]  Review of Systems    Physical Exam     Initial Vitals [06/22/25 0334]   BP Pulse Resp Temp SpO2   113/66 61 20 98.4 °F (36.9 °C) 96 %      MAP       --         Physical Exam    Nursing note and vitals reviewed.  Constitutional: She appears well-developed and well-nourished.   HENT:   Head: Normocephalic and atraumatic.   Right Ear: External ear normal.   Left Ear: External ear normal.   Eyes: EOM are normal. Right eye exhibits no discharge. Left eye exhibits no discharge. No scleral icterus.   Neck: Neck supple. No tracheal deviation present. No JVD present.   Cardiovascular:  Normal heart sounds and intact distal pulses.     Exam reveals no gallop and no friction rub.       No murmur heard.  Pulses:       Radial pulses are 2+ on the right side and 2+ on the left side.        Dorsalis pedis pulses are 2+ on the right side and 2+ on the left side.   Pulmonary/Chest: Breath sounds normal. No stridor. No respiratory distress. She has no wheezes. She has no rhonchi. She has no rales. She exhibits no tenderness.   Abdominal: Abdomen is soft. She exhibits no distension and no mass. There is abdominal tenderness.   Right upper quadrant pain There is no rebound and no guarding.   Musculoskeletal:          Pt. Placed in hospital gown and socks. Belongings removed   General: Normal range of motion.      Cervical back: Neck supple.     Lymphadenopathy:     She has no cervical adenopathy.   Neurological: She is alert and oriented to person, place, and time. GCS score is 15.   Skin: Skin is warm and dry. Capillary refill takes less than 2 seconds.   Psychiatric: She has a normal mood and affect. Her behavior is normal. Judgment and thought content normal.         ED Course   Procedures  Labs Reviewed   COMPREHENSIVE METABOLIC PANEL - Abnormal       Result Value    Sodium 138      Potassium 3.8      Chloride 110      CO2 19 (*)     Glucose 128 (*)     BUN 8      Creatinine 0.5      Calcium 8.5 (*)     Protein Total 6.4      Albumin 3.6      Bilirubin Total 1.5 (*)            (*)      (*)     Anion Gap 9      eGFR >60     URINALYSIS, REFLEX TO URINE CULTURE - Abnormal    Color, UA Yellow      Appearance, UA Hazy (*)     pH, UA 8.0      Spec Grav UA 1.030      Protein, UA Trace (*)     Glucose, UA Negative      Ketones, UA Negative      Bilirubin, UA Negative      Blood, UA Negative      Nitrites, UA Negative      Urobilinogen, UA 4.0-6.0 (*)     Leukocyte Esterase, UA 1+ (*)    CBC WITH DIFFERENTIAL - Abnormal    WBC 8.44      RBC 4.22      HGB 11.6 (*)     HCT 36.1 (*)     MCV 86      MCH 27.5      MCHC 32.1      RDW 14.2      Platelet Count 362      MPV 10.8      Nucleated RBC 0      Neut % 75.8 (*)     Lymph % 16.8 (*)     Mono % 5.9      Eos % 0.7      Basophil % 0.6      Imm Grans % 0.2      Neut # 6.39      Lymph # 1.42      Mono # 0.50      Eos # 0.06      Baso # 0.05      Imm Grans # 0.02     URINALYSIS MICROSCOPIC - Abnormal    RBC, UA 15 (*)     WBC, UA 0      Bacteria, UA Rare      Squamous Epithelial Cells, UA 7      Amorphous, UA Occasional      Microscopic Comment       HEPATITIS C ANTIBODY - Normal    Hep C Ab Interp Non-Reactive     HIV 1 / 2 ANTIBODY - Normal    HIV 1/2 Ag/Ab Non-Reactive     LIPASE - Normal    Lipase Level 22     HEP C VIRUS  HOLD SPECIMEN    Extra Tube Hold for add-ons.     CBC W/ AUTO DIFFERENTIAL    Narrative:     The following orders were created for panel order CBC auto differential.  Procedure                               Abnormality         Status                     ---------                               -----------         ------                     CBC with Differential[[id number]]       Abnormal            Final result                 Please view results for these tests on the individual orders.   GREY TOP URINE HOLD    Extra Tube Hold for add-ons.     POCT URINE PREGNANCY    POC Preg Test, Ur Negative       Acceptable Yes            Imaging Results              CT Abdomen Pelvis With IV Contrast NO Oral Contrast (Final result)  Result time 06/22/25 05:37:31      Final result by Donny Gomes MD (06/22/25 05:37:31)                   Impression:      No acute abnormality in the abdomen and pelvis.    Cholelithiasis.  No evidence of cholecystitis.    Minimal periportal edema.    Splenomegaly.    Electronically signed by resident: Alexys Payne  Date:    06/22/2025  Time:    05:01    Electronically signed by: Donny Gomes MD  Date:    06/22/2025  Time:    05:37               Narrative:    EXAMINATION:  CT ABDOMEN PELVIS WITH IV CONTRAST    CLINICAL HISTORY:  Nausea/vomiting    TECHNIQUE:  Low dose axial images, sagittal and coronal reformations were obtained from the lung bases to the pubic symphysis following the IV administration of 100 mL of Omnipaque 350 intravenous contrast. Oral contrast was not administered.    COMPARISON:  MRI MRCP 05/09/2025    FINDINGS:  Lungs: Bilateral dependent atelectasis.  No consolidation or pleural effusion in the visualized lung bases.    Heart: Normal size. No pericardial effusion.    Liver: Normal size.  No focal abnormality. Minimal periportal edema.  .    Gallbladder: Nondistended.  Gallstones seen on prior MRCP poorly visualized.  No pericholecystic  inflammatory changes.    Bile ducts: No intrahepatic or extrahepatic biliary ductal dilatation.    Spleen: Splenomegaly measuring 12.1 cm AP.    Pancreas: No mass. No peripancreatic fat stranding.    Adrenals: No significant abnormality    Renal/Ureters: The kidneys are normal in size . No stones.  No focal renal abnormalities.  No hydroureteronephrosis. The urinary bladder is unremarkable.    Reproductive: Uterus is without significant abnormality. No adnexal mass.    Stomach/Bowel: Stomach is unremarkable.  Small bowel is normal caliber without obstruction or inflammation. Appendix is within normal limits.  Large bowel is normal caliber without obstruction or inflammation.    Peritoneum: No free fluid. No intraperitoneal free air.    Lymph Nodes: No pathologic jennifer enlargement in the abdomen or pelvis.    Vasculature: Abdominal aorta tapers normally.  No significant atherosclerosis of the abdominal aorta and its branches. Portal vasculature, SMV, and splenic vein are patent.    Bones: No acute fractures or osseous destructive lesions.    Soft Tissues: No significant abnormality.                        Preliminary result by Alexys Payne MD (06/22/25 05:13:11)                   Impression:      No acute abnormality in the abdomen and pelvis.    Cholelithiasis evidence of cholecystitis.    Splenomegaly.    Electronically signed by resident: Alexys Payne  Date:    06/22/2025  Time:    05:01                 Narrative:    EXAMINATION:  CT ABDOMEN PELVIS WITH IV CONTRAST    CLINICAL HISTORY:  Nausea/vomiting;    TECHNIQUE:  Low dose axial images, sagittal and coronal reformations were obtained from the lung bases to the pubic symphysis following the IV administration of 100 mL of Omnipaque 350 intravenous contrast. Oral contrast was not administered.    COMPARISON:  MRI MRCP 05/09/2025    FINDINGS:  Lungs: Bilateral dependent atelectasis.  No consolidation or pleural effusion in the visualized lung  bases.    Heart: Normal size. No pericardial effusion.    Liver: Normal size.  No focal abnormality. .    Gallbladder: Nondistended.  Gallstones seen on prior MRCP poorly visualized.  No pericholecystic inflammatory changes.    Bile ducts: No intrahepatic or extrahepatic biliary ductal dilatation.    Spleen: Splenomegaly measuring 12.1 cm AP.    Pancreas: No mass. No peripancreatic fat stranding.    Adrenals: No significant abnormality    Renal/Ureters: The kidneys are normal in size . No stones.  No focal renal abnormalities.  No hydroureteronephrosis. The urinary bladder is unremarkable.    Reproductive: Uterus is without significant abnormality. No adnexal mass.    Stomach/Bowel: Stomach is unremarkable.  Small bowel is normal caliber without obstruction or inflammation. Appendix is within normal limits.  Large bowel is normal caliber without obstruction or inflammation.    Peritoneum: No free fluid. No intraperitoneal free air.    Lymph Nodes: No pathologic jennifer enlargement in the abdomen or pelvis.    Vasculature: Abdominal aorta tapers normally.  No significant atherosclerosis of the abdominal aorta and its branches. Portal vasculature, SMV, and splenic vein are patent.    Bones: No acute fractures or osseous destructive lesions.    Soft Tissues: No significant abnormality.                                       Medications   sodium chloride 0.9% bolus 1,000 mL 1,000 mL (0 mLs Intravenous Stopped 6/22/25 0634)   ondansetron injection 4 mg (4 mg Intravenous Given 6/22/25 0435)   morphine injection 4 mg (4 mg Intravenous Given 6/22/25 0435)   iohexoL (OMNIPAQUE 350) injection 100 mL (100 mLs Intravenous Given 6/22/25 0450)     Medical Decision Making  Amount and/or Complexity of Data Reviewed  Labs: ordered.  Radiology: ordered.    Risk  Prescription drug management.               ED Course as of 06/22/25 0645   Sun Jun 22, 2025   0643 Patient is a 29-year-old female past medical history known cholelithiasis  with scheduled outpatient cholecystectomy for July that is presenting for acute on chronic right upper quadrant pain.  Patient states that she has been doing well, followed up with General surgery.  Patient admits that she ate tuna Sushi, then had acute pain.  Patient had right upper quadrant abdominal pain described as severe roughly 4 days ago that resolved.  Patient then had abdominal pain tonight consistent with her gallbladder pain with nausea/vomiting that has been ongoing since roughly 10:00 p.m..  Patient denies any fevers, chills, chest pains, shortness of breath, cough.    Physical exam: Well-appearing 29-year-old female, no distress, appropriately conversational.  Benign cardiac, respiratory, abdominal exam.  Patient has tenderness to palpation right upper quadrant.  No guarding, no rigidity.  No obvious jaundice or scleral icterus.    Plan:  Workup noted.  The patient has had ongoing episodes.  We will consult General surgery to evaluate for possible cholecystectomy however if deemed nonemergent then patient will follow up with General surgery outpatient.  The patient is agreeable to this plan.  Patient otherwise is well-appearing, symptoms controlled in ED.    Attestation of Dr. Marie (Attending Physician):      I have reviewed the record and the patient care provided by the Resident provider and agree with the documented HPI, ROS, PE.  I also agree with the treatment plan and work up and I have performed an independent history / physical exam and MDM. [RT]      ED Course User Index  [RT] Zhen Marie MD                       29-year-old female presents with a right upper quadrant pain.  Differential diagnosis for this patient includes but isn't limited to cholecystitis, choledocholithiasis, pancreatitis, bowel obstruction.  Bowel obstruction less likely as patient is having normal bowel movements.  Given the patient is known cholelithiasis, this is likely cholecystitis.  Plan to image labs and pain  control.  Pain control achieved with morphine.  Zofran for nausea.  We will discuss with surgery for evaluation.  Anticipate likely admission following surgical evaluation.  Patient is in agreement with the plan.    At time of sign-out patient was pending labs and surgical evaluation.  Patient is signed out to oncoming team    Clinical Impression:  Final diagnoses:  [R10.9] Flank pain                     Lopez, MD Leeroy  Resident  06/22/25 0519         [1]   Social History  Tobacco Use    Smoking status: Unknown        Zhen Marie MD  06/22/25 1782

## 2025-06-22 NOTE — SUBJECTIVE & OBJECTIVE
No current facility-administered medications on file prior to encounter.     Current Outpatient Medications on File Prior to Encounter   Medication Sig    ESTARYLLA 0.25-0.035 mg per tablet Take 1 tablet by mouth once daily.       Review of patient's allergies indicates:  No Known Allergies    History reviewed. No pertinent past medical history.  History reviewed. No pertinent surgical history.  Family History    None       Tobacco Use    Smoking status: Unknown    Smokeless tobacco: Not on file   Substance and Sexual Activity    Alcohol use: Not on file    Drug use: Not on file    Sexual activity: Not on file     Review of Systems   All other systems reviewed and are negative.    Objective:     Vital Signs (Most Recent):  Temp: 98.4 °F (36.9 °C) (06/22/25 0334)  Pulse: 60 (06/22/25 0700)  Resp: 19 (06/22/25 0700)  BP: 127/81 (06/22/25 0700)  SpO2: 96 % (06/22/25 0334) Vital Signs (24h Range):  Temp:  [98.4 °F (36.9 °C)] 98.4 °F (36.9 °C)  Pulse:  [60-78] 60  Resp:  [16-20] 19  SpO2:  [96 %] 96 %  BP: (113-149)/(66-87) 127/81     Weight: 89.9 kg (198 lb 3.1 oz)  Body mass index is 36.25 kg/m².     Physical Exam  Vitals and nursing note reviewed.   Constitutional:       General: She is not in acute distress.     Appearance: Normal appearance. She is obese. She is not toxic-appearing.   HENT:      Head: Normocephalic.      Nose: Nose normal.      Mouth/Throat:      Mouth: Mucous membranes are moist.   Eyes:      General: No scleral icterus.     Conjunctiva/sclera: Conjunctivae normal.   Cardiovascular:      Rate and Rhythm: Normal rate.   Pulmonary:      Effort: Pulmonary effort is normal. No respiratory distress.   Abdominal:      Comments: Obese, soft, no surgical scars, (-) Morocho sign, non-tender throughout    Musculoskeletal:         General: No swelling.   Skin:     General: Skin is warm and dry.   Neurological:      General: No focal deficit present.      Mental Status: She is alert and oriented to person,  place, and time.   Psychiatric:         Mood and Affect: Mood normal.         Behavior: Behavior normal.         Thought Content: Thought content normal.            I have reviewed all pertinent lab results within the past 24 hours.  CBC:   Recent Labs   Lab 06/22/25  0435   WBC 8.44   RBC 4.22   HGB 11.6*   HCT 36.1*      MCV 86   MCH 27.5   MCHC 32.1     CMP:   Recent Labs   Lab 06/22/25  0545   *   CALCIUM 8.5*   ALBUMIN 3.6   PROT 6.4      K 3.8   CO2 19*      BUN 8   CREATININE 0.5   ALKPHOS 117   *   *   BILITOT 1.5*     Lipase: 22    Significant Diagnostics:    CT ABDOMEN PELVIS WITH IV CONTRAST      COMPARISON:  MRI MRCP 05/09/2025     FINDINGS:  Lungs: Bilateral dependent atelectasis.  No consolidation or pleural effusion in the visualized lung bases.     Heart: Normal size. No pericardial effusion.     Liver: Normal size.  No focal abnormality. Minimal periportal edema.  .     Gallbladder: Nondistended.  Gallstones seen on prior MRCP poorly visualized.  No pericholecystic inflammatory changes.     Bile ducts: No intrahepatic or extrahepatic biliary ductal dilatation.     Spleen: Splenomegaly measuring 12.1 cm AP.     Pancreas: No mass. No peripancreatic fat stranding.     Adrenals: No significant abnormality     Renal/Ureters: The kidneys are normal in size . No stones.  No focal renal abnormalities.  No hydroureteronephrosis. The urinary bladder is unremarkable.     Reproductive: Uterus is without significant abnormality. No adnexal mass.     Stomach/Bowel: Stomach is unremarkable.  Small bowel is normal caliber without obstruction or inflammation. Appendix is within normal limits.  Large bowel is normal caliber without obstruction or inflammation.     Peritoneum: No free fluid. No intraperitoneal free air.     Lymph Nodes: No pathologic jennifer enlargement in the abdomen or pelvis.     Vasculature: Abdominal aorta tapers normally.  No significant atherosclerosis of  the abdominal aorta and its branches. Portal vasculature, SMV, and splenic vein are patent.     Bones: No acute fractures or osseous destructive lesions.     Soft Tissues: No significant abnormality.     Impression:     No acute abnormality in the abdomen and pelvis.     Cholelithiasis.  No evidence of cholecystitis.     Minimal periportal edema.     Splenomegaly.

## 2025-07-21 ENCOUNTER — ANESTHESIA EVENT (OUTPATIENT)
Dept: SURGERY | Facility: HOSPITAL | Age: 30
End: 2025-07-21
Payer: COMMERCIAL

## 2025-07-21 ENCOUNTER — ANESTHESIA (OUTPATIENT)
Dept: SURGERY | Facility: HOSPITAL | Age: 30
End: 2025-07-21
Payer: COMMERCIAL

## 2025-07-21 ENCOUNTER — HOSPITAL ENCOUNTER (OUTPATIENT)
Facility: HOSPITAL | Age: 30
Discharge: HOME | End: 2025-07-21
Attending: STUDENT IN AN ORGANIZED HEALTH CARE EDUCATION/TRAINING PROGRAM | Admitting: STUDENT IN AN ORGANIZED HEALTH CARE EDUCATION/TRAINING PROGRAM
Payer: COMMERCIAL

## 2025-07-21 VITALS
SYSTOLIC BLOOD PRESSURE: 125 MMHG | HEART RATE: 78 BPM | DIASTOLIC BLOOD PRESSURE: 76 MMHG | WEIGHT: 198 LBS | BODY MASS INDEX: 36.44 KG/M2 | OXYGEN SATURATION: 99 % | HEIGHT: 62 IN | TEMPERATURE: 98 F | RESPIRATION RATE: 20 BRPM

## 2025-07-21 DIAGNOSIS — K85.10 ACUTE BILIARY PANCREATITIS WITHOUT INFECTION OR NECROSIS: ICD-10-CM

## 2025-07-21 LAB
B-HCG UR QL: NEGATIVE
CTP QC/QA: YES

## 2025-07-21 PROCEDURE — 63600175 PHARM REV CODE 636 W HCPCS: Performed by: STUDENT IN AN ORGANIZED HEALTH CARE EDUCATION/TRAINING PROGRAM

## 2025-07-21 PROCEDURE — 71000033 HC RECOVERY, INTIAL HOUR: Performed by: STUDENT IN AN ORGANIZED HEALTH CARE EDUCATION/TRAINING PROGRAM

## 2025-07-21 PROCEDURE — 63600175 PHARM REV CODE 636 W HCPCS: Performed by: NURSE ANESTHETIST, CERTIFIED REGISTERED

## 2025-07-21 PROCEDURE — 25000003 PHARM REV CODE 250: Performed by: STUDENT IN AN ORGANIZED HEALTH CARE EDUCATION/TRAINING PROGRAM

## 2025-07-21 PROCEDURE — 25000003 PHARM REV CODE 250: Performed by: ANESTHESIOLOGY

## 2025-07-21 PROCEDURE — 36000710: Performed by: STUDENT IN AN ORGANIZED HEALTH CARE EDUCATION/TRAINING PROGRAM

## 2025-07-21 PROCEDURE — 25000003 PHARM REV CODE 250: Performed by: NURSE ANESTHETIST, CERTIFIED REGISTERED

## 2025-07-21 PROCEDURE — 71000015 HC POSTOP RECOV 1ST HR: Performed by: STUDENT IN AN ORGANIZED HEALTH CARE EDUCATION/TRAINING PROGRAM

## 2025-07-21 PROCEDURE — 36000711: Performed by: STUDENT IN AN ORGANIZED HEALTH CARE EDUCATION/TRAINING PROGRAM

## 2025-07-21 PROCEDURE — 63600175 PHARM REV CODE 636 W HCPCS: Performed by: ANESTHESIOLOGY

## 2025-07-21 PROCEDURE — 37000009 HC ANESTHESIA EA ADD 15 MINS: Performed by: STUDENT IN AN ORGANIZED HEALTH CARE EDUCATION/TRAINING PROGRAM

## 2025-07-21 PROCEDURE — 27201423 OPTIME MED/SURG SUP & DEVICES STERILE SUPPLY: Performed by: STUDENT IN AN ORGANIZED HEALTH CARE EDUCATION/TRAINING PROGRAM

## 2025-07-21 PROCEDURE — 71000039 HC RECOVERY, EACH ADD'L HOUR: Performed by: STUDENT IN AN ORGANIZED HEALTH CARE EDUCATION/TRAINING PROGRAM

## 2025-07-21 PROCEDURE — 88304 TISSUE EXAM BY PATHOLOGIST: CPT | Mod: TC | Performed by: STUDENT IN AN ORGANIZED HEALTH CARE EDUCATION/TRAINING PROGRAM

## 2025-07-21 PROCEDURE — 37000008 HC ANESTHESIA 1ST 15 MINUTES: Performed by: STUDENT IN AN ORGANIZED HEALTH CARE EDUCATION/TRAINING PROGRAM

## 2025-07-21 PROCEDURE — 47562 LAPAROSCOPIC CHOLECYSTECTOMY: CPT | Mod: ,,, | Performed by: STUDENT IN AN ORGANIZED HEALTH CARE EDUCATION/TRAINING PROGRAM

## 2025-07-21 PROCEDURE — 63600175 PHARM REV CODE 636 W HCPCS

## 2025-07-21 RX ORDER — CEFAZOLIN 2 G/1
2 INJECTION, POWDER, FOR SOLUTION INTRAMUSCULAR; INTRAVENOUS
Status: COMPLETED | OUTPATIENT
Start: 2025-07-21 | End: 2025-07-21

## 2025-07-21 RX ORDER — ROCURONIUM BROMIDE 10 MG/ML
INJECTION, SOLUTION INTRAVENOUS
Status: DISCONTINUED | OUTPATIENT
Start: 2025-07-21 | End: 2025-07-21

## 2025-07-21 RX ORDER — GLUCAGON 1 MG
1 KIT INJECTION
Status: DISCONTINUED | OUTPATIENT
Start: 2025-07-21 | End: 2025-07-21 | Stop reason: HOSPADM

## 2025-07-21 RX ORDER — OXYCODONE HYDROCHLORIDE 5 MG/1
5 TABLET ORAL EVERY 4 HOURS PRN
Status: DISCONTINUED | OUTPATIENT
Start: 2025-07-21 | End: 2025-07-21

## 2025-07-21 RX ORDER — ONDANSETRON 8 MG/1
8 TABLET, ORALLY DISINTEGRATING ORAL EVERY 8 HOURS PRN
Status: DISCONTINUED | OUTPATIENT
Start: 2025-07-21 | End: 2025-07-21 | Stop reason: HOSPADM

## 2025-07-21 RX ORDER — SODIUM CHLORIDE 9 MG/ML
INJECTION, SOLUTION INTRAVENOUS CONTINUOUS
Status: DISCONTINUED | OUTPATIENT
Start: 2025-07-21 | End: 2025-07-21 | Stop reason: HOSPADM

## 2025-07-21 RX ORDER — OXYCODONE HYDROCHLORIDE 5 MG/1
5 TABLET ORAL EVERY 4 HOURS PRN
Qty: 5 TABLET | Refills: 0 | Status: SHIPPED | OUTPATIENT
Start: 2025-07-21

## 2025-07-21 RX ORDER — HYDROMORPHONE HYDROCHLORIDE 2 MG/ML
0.2 INJECTION, SOLUTION INTRAMUSCULAR; INTRAVENOUS; SUBCUTANEOUS EVERY 5 MIN PRN
Status: DISCONTINUED | OUTPATIENT
Start: 2025-07-21 | End: 2025-07-21

## 2025-07-21 RX ORDER — SODIUM CHLORIDE 9 MG/ML
INJECTION, SOLUTION INTRAVENOUS CONTINUOUS
Status: DISCONTINUED | OUTPATIENT
Start: 2025-07-21 | End: 2025-07-21

## 2025-07-21 RX ORDER — LIDOCAINE HYDROCHLORIDE 20 MG/ML
INJECTION INTRAVENOUS
Status: DISCONTINUED | OUTPATIENT
Start: 2025-07-21 | End: 2025-07-21

## 2025-07-21 RX ORDER — HYDROMORPHONE HYDROCHLORIDE 2 MG/ML
1 INJECTION, SOLUTION INTRAMUSCULAR; INTRAVENOUS; SUBCUTANEOUS EVERY 4 HOURS PRN
Status: DISCONTINUED | OUTPATIENT
Start: 2025-07-21 | End: 2025-07-21

## 2025-07-21 RX ORDER — PROCHLORPERAZINE EDISYLATE 5 MG/ML
5 INJECTION INTRAMUSCULAR; INTRAVENOUS EVERY 30 MIN PRN
Status: DISCONTINUED | OUTPATIENT
Start: 2025-07-21 | End: 2025-07-21 | Stop reason: HOSPADM

## 2025-07-21 RX ORDER — OXYCODONE HYDROCHLORIDE 5 MG/1
5 TABLET ORAL
Status: DISCONTINUED | OUTPATIENT
Start: 2025-07-21 | End: 2025-07-21 | Stop reason: HOSPADM

## 2025-07-21 RX ORDER — KETOROLAC TROMETHAMINE 30 MG/ML
INJECTION, SOLUTION INTRAMUSCULAR; INTRAVENOUS
Status: DISCONTINUED | OUTPATIENT
Start: 2025-07-21 | End: 2025-07-21

## 2025-07-21 RX ORDER — KETOROLAC TROMETHAMINE 30 MG/ML
15 INJECTION, SOLUTION INTRAMUSCULAR; INTRAVENOUS EVERY 8 HOURS PRN
Status: DISCONTINUED | OUTPATIENT
Start: 2025-07-21 | End: 2025-07-21 | Stop reason: HOSPADM

## 2025-07-21 RX ORDER — PROCHLORPERAZINE EDISYLATE 5 MG/ML
5 INJECTION INTRAMUSCULAR; INTRAVENOUS EVERY 6 HOURS PRN
Status: DISCONTINUED | OUTPATIENT
Start: 2025-07-21 | End: 2025-07-21

## 2025-07-21 RX ORDER — PROPOFOL 10 MG/ML
VIAL (ML) INTRAVENOUS
Status: DISCONTINUED | OUTPATIENT
Start: 2025-07-21 | End: 2025-07-21

## 2025-07-21 RX ORDER — ACETAMINOPHEN 10 MG/ML
INJECTION, SOLUTION INTRAVENOUS
Status: DISCONTINUED | OUTPATIENT
Start: 2025-07-21 | End: 2025-07-21

## 2025-07-21 RX ORDER — HYDROMORPHONE HYDROCHLORIDE 2 MG/ML
0.2 INJECTION, SOLUTION INTRAMUSCULAR; INTRAVENOUS; SUBCUTANEOUS EVERY 5 MIN PRN
Status: DISCONTINUED | OUTPATIENT
Start: 2025-07-21 | End: 2025-07-21 | Stop reason: HOSPADM

## 2025-07-21 RX ORDER — ONDANSETRON 4 MG/1
4 TABLET, ORALLY DISINTEGRATING ORAL EVERY 12 HOURS PRN
Qty: 10 TABLET | Refills: 0 | Status: SHIPPED | OUTPATIENT
Start: 2025-07-21

## 2025-07-21 RX ORDER — ONDANSETRON HYDROCHLORIDE 2 MG/ML
INJECTION, SOLUTION INTRAVENOUS
Status: DISCONTINUED | OUTPATIENT
Start: 2025-07-21 | End: 2025-07-21

## 2025-07-21 RX ORDER — INDOCYANINE GREEN AND WATER 25 MG
1.25 KIT INJECTION ONCE
Status: COMPLETED | OUTPATIENT
Start: 2025-07-21 | End: 2025-07-21

## 2025-07-21 RX ORDER — ACETAMINOPHEN 325 MG/1
650 TABLET ORAL EVERY 4 HOURS PRN
Status: DISCONTINUED | OUTPATIENT
Start: 2025-07-21 | End: 2025-07-21 | Stop reason: HOSPADM

## 2025-07-21 RX ORDER — PHENYLEPHRINE HYDROCHLORIDE 10 MG/ML
INJECTION INTRAVENOUS
Status: DISCONTINUED | OUTPATIENT
Start: 2025-07-21 | End: 2025-07-21

## 2025-07-21 RX ORDER — MIDAZOLAM HYDROCHLORIDE 1 MG/ML
INJECTION INTRAMUSCULAR; INTRAVENOUS
Status: DISCONTINUED | OUTPATIENT
Start: 2025-07-21 | End: 2025-07-21

## 2025-07-21 RX ORDER — FENTANYL CITRATE 50 UG/ML
INJECTION, SOLUTION INTRAMUSCULAR; INTRAVENOUS
Status: DISCONTINUED | OUTPATIENT
Start: 2025-07-21 | End: 2025-07-21

## 2025-07-21 RX ORDER — ONDANSETRON HYDROCHLORIDE 2 MG/ML
4 INJECTION, SOLUTION INTRAVENOUS ONCE AS NEEDED
Status: DISCONTINUED | OUTPATIENT
Start: 2025-07-21 | End: 2025-07-21 | Stop reason: HOSPADM

## 2025-07-21 RX ORDER — SCOPOLAMINE 1 MG/3D
1 PATCH, EXTENDED RELEASE TRANSDERMAL ONCE
Status: DISCONTINUED | OUTPATIENT
Start: 2025-07-21 | End: 2025-07-21 | Stop reason: HOSPADM

## 2025-07-21 RX ORDER — DEXAMETHASONE SODIUM PHOSPHATE 4 MG/ML
INJECTION, SOLUTION INTRA-ARTICULAR; INTRALESIONAL; INTRAMUSCULAR; INTRAVENOUS; SOFT TISSUE
Status: DISCONTINUED | OUTPATIENT
Start: 2025-07-21 | End: 2025-07-21

## 2025-07-21 RX ADMIN — GLYCOPYRROLATE 0.2 MG: 0.2 INJECTION, SOLUTION INTRAMUSCULAR; INTRAVITREAL at 02:07

## 2025-07-21 RX ADMIN — SODIUM CHLORIDE, SODIUM LACTATE, POTASSIUM CHLORIDE, AND CALCIUM CHLORIDE: .6; .31; .03; .02 INJECTION, SOLUTION INTRAVENOUS at 02:07

## 2025-07-21 RX ADMIN — MIDAZOLAM HYDROCHLORIDE 2 MG: 1 INJECTION, SOLUTION INTRAMUSCULAR; INTRAVENOUS at 01:07

## 2025-07-21 RX ADMIN — FENTANYL CITRATE 25 MCG: 50 INJECTION INTRAMUSCULAR; INTRAVENOUS at 04:07

## 2025-07-21 RX ADMIN — PHENYLEPHRINE HYDROCHLORIDE 100 MCG: 10 INJECTION INTRAVENOUS at 02:07

## 2025-07-21 RX ADMIN — FENTANYL CITRATE 25 MCG: 50 INJECTION INTRAMUSCULAR; INTRAVENOUS at 03:07

## 2025-07-21 RX ADMIN — KETOROLAC TROMETHAMINE 30 MG: 30 INJECTION, SOLUTION INTRAMUSCULAR; INTRAVENOUS at 03:07

## 2025-07-21 RX ADMIN — HYDROMORPHONE HYDROCHLORIDE 0.2 MG: 2 INJECTION INTRAMUSCULAR; INTRAVENOUS; SUBCUTANEOUS at 04:07

## 2025-07-21 RX ADMIN — PROPOFOL 150 MG: 10 INJECTION, EMULSION INTRAVENOUS at 01:07

## 2025-07-21 RX ADMIN — PROPOFOL 50 MG: 10 INJECTION, EMULSION INTRAVENOUS at 01:07

## 2025-07-21 RX ADMIN — DEXAMETHASONE SODIUM PHOSPHATE 8 MG: 4 INJECTION, SOLUTION INTRA-ARTICULAR; INTRALESIONAL; INTRAMUSCULAR; INTRAVENOUS; SOFT TISSUE at 02:07

## 2025-07-21 RX ADMIN — OXYCODONE HYDROCHLORIDE 5 MG: 5 TABLET ORAL at 04:07

## 2025-07-21 RX ADMIN — ONDANSETRON 4 MG: 2 INJECTION, SOLUTION INTRAMUSCULAR; INTRAVENOUS at 03:07

## 2025-07-21 RX ADMIN — LIDOCAINE HYDROCHLORIDE 100 MG: 20 INJECTION, SOLUTION INTRAVENOUS at 01:07

## 2025-07-21 RX ADMIN — SUGAMMADEX 200 MG: 100 INJECTION, SOLUTION INTRAVENOUS at 03:07

## 2025-07-21 RX ADMIN — FENTANYL CITRATE 100 MCG: 50 INJECTION INTRAMUSCULAR; INTRAVENOUS at 01:07

## 2025-07-21 RX ADMIN — PHENYLEPHRINE HYDROCHLORIDE 200 MCG: 10 INJECTION INTRAVENOUS at 03:07

## 2025-07-21 RX ADMIN — SODIUM CHLORIDE, SODIUM LACTATE, POTASSIUM CHLORIDE, AND CALCIUM CHLORIDE: .6; .31; .03; .02 INJECTION, SOLUTION INTRAVENOUS at 01:07

## 2025-07-21 RX ADMIN — ACETAMINOPHEN 1000 MG: 10 INJECTION, SOLUTION INTRAVENOUS at 02:07

## 2025-07-21 RX ADMIN — SCOPOLAMINE 1 PATCH: 1.5 PATCH, EXTENDED RELEASE TRANSDERMAL at 12:07

## 2025-07-21 RX ADMIN — ONDANSETRON 4 MG: 2 INJECTION, SOLUTION INTRAMUSCULAR; INTRAVENOUS at 02:07

## 2025-07-21 RX ADMIN — FENTANYL CITRATE 25 MCG: 50 INJECTION INTRAMUSCULAR; INTRAVENOUS at 02:07

## 2025-07-21 RX ADMIN — CEFAZOLIN 2 G: 2 INJECTION, POWDER, FOR SOLUTION INTRAMUSCULAR; INTRAVENOUS at 02:07

## 2025-07-21 RX ADMIN — SODIUM CHLORIDE, SODIUM LACTATE, POTASSIUM CHLORIDE, AND CALCIUM CHLORIDE: .6; .31; .03; .02 INJECTION, SOLUTION INTRAVENOUS at 03:07

## 2025-07-21 RX ADMIN — ROCURONIUM BROMIDE 20 MG: 10 INJECTION, SOLUTION INTRAVENOUS at 02:07

## 2025-07-21 RX ADMIN — INDOCYANINE GREEN AND WATER 1.25 MG: KIT at 12:07

## 2025-07-21 RX ADMIN — ROCURONIUM BROMIDE 50 MG: 10 INJECTION, SOLUTION INTRAVENOUS at 01:07

## 2025-07-21 RX ADMIN — PROCHLORPERAZINE EDISYLATE 5 MG: 5 INJECTION INTRAMUSCULAR; INTRAVENOUS at 05:07

## 2025-07-21 NOTE — TRANSFER OF CARE
"Anesthesia Transfer of Care Note    Patient: Blanca Acosta    Procedure(s) Performed: Procedure(s) (LRB):  ROBOTIC CHOLECYSTECTOMY (N/A)    Patient location: PACU    Anesthesia Type: general    Transport from OR: Transported from OR on 6-10 L/min O2 by face mask with adequate spontaneous ventilation    Post pain: adequate analgesia    Post assessment: no apparent anesthetic complications    Post vital signs: stable    Level of consciousness: awake and alert    Nausea/Vomiting: no nausea/vomiting    Complications: none    Transfer of care protocol was followed      Last vitals: Visit Vitals  /87   Pulse 77   Temp 37.3 °C (99.1 °F) (Skin)   Resp 16   Ht 5' 2" (1.575 m)   Wt 89.8 kg (198 lb)   SpO2 99%   Breastfeeding No   BMI 36.21 kg/m²     "

## 2025-07-21 NOTE — OP NOTE
Op Note    Name: Blanca Acosta  MRN: [id number]  Date: 7/21/2025    Preoperative diagnosis: cholecystitis    Postoperative diagnosis: Same    Anesthesia:  General    Assistants: JOSE Ceron MD     Findings: inflamed gallbladder - critical view of safety achieved     Estimated Blood Loss:  4 cc    Specimens:  Specimens (From admission, onward)       Start     Ordered    07/21/25 1500  Specimen to Pathology  RELEASE UPON ORDERING        References:    Click here for ordering Quick Tip   Question:  Release to patient  Answer:  Immediate    07/21/25 1500                      Procedures:     Robot assisted laparoscopic cholecystectomy     Procedure Description:     I met the patient in the preoperative area and identified the patient by 2 identifiers.  All of the patient's preoperative paperwork was found to be in order.  ICG had been given preoperatively.  The patient was taken the operating room placed in supine position on the operating table.  General anesthesia was smoothly induced and endotracheal tube placed.  The patient's skin was prepped with chlorhexidine solution and draped in a sterile manner.  The team then observed preprocedural pause, completing the entire presurgical checklist.  Everyone agreed to proceed with the case.     I began with optical entry into the left hemiabdomen near Hsu's point with a 5 mm Optiview trocar.  I obtained abdominal access without difficulty and then pneumoperitoneum.  I placed four 8 mm trochars across the abdomen. We then docked the robot in the usual way.  I incised the peritoneum over the gallbladder and follow this incision circumferentially, freeing the gallbladder from the cystic plate.  I cleared the fibrofatty tissue from the hepatocystic triangle.  I identified only 2 structures entering the gallbladder. Greater than 1/3 of the cystic plate was cleared. Having achieved the critical view of safety, I took the cystic artery with cautery as it branched over the fundus  and the cystic duct with Hem-o-lena clips, with 3 clips on the patient's side and 1 clip on the specimen side.  The duct was then divided and the rest of the gallbladder taken off the cystic plate with cautery.  There was a small amount of bile spillage. We irrigated the abdomen. The gallbladder was then placed into a retrieval bag and removed from the patient.  I inspected the liver bed and it appeared hemostatic.  I closed the extraction site with a 0 Vicryl suture and a Karthik Arita device.  The skin was reapproximated with subcuticular 4-0 Monocryl and covered with Dermabond.  The patient was awakened from anesthesia and taken to the postanesthesia care unit in good condition.       Complications:  None           Rafiq Kan MD  Phone Number: [phone]    Date: 7/21/2025  Time: 3:51 PM

## 2025-07-21 NOTE — ANESTHESIA PROCEDURE NOTES
Intubation    Date/Time: 7/21/2025 1:54 PM    Performed by: Jessica Sutton CRNA  Authorized by: Ginny Fuentes MD    Intubation:     Induction:  Intravenous    Intubated:  Postinduction    Mask Ventilation:  Easy mask    Attempts:  1    Attempted By:  CRNA    Method of Intubation:  Video laryngoscopy    Blade:  Macias 3    Laryngeal View Grade: Grade I - full view of cords      Difficult Airway Encountered?: No      Complications:  None    Airway Device:  Oral endotracheal tube    Airway Device Size:  7.0    Style/Cuff Inflation:  Cuffed (inflated to minimal occlusive pressure)    Tube secured:  21    Secured at:  The lips    Placement Verified By:  Capnometry    Complicating Factors:  None    Findings Post-Intubation:  BS equal bilateral and atraumatic/condition of teeth unchanged

## 2025-07-21 NOTE — DISCHARGE INSTRUCTIONS
Take 1000 mg acetaminophen every six hours for the first couple of days after surgery. Do not exceed 4,000 mg every 24 hours.   Take oxycodone only for severe pain. This will cause constipation. Take miralax daily if you are taking oxycodone, unless you are having loose stools.     Call Dr. Kan or come to the emergency department if you have unrelenting pain not relieved by medication, fever >101, increasing redness/drainage from wounds, nausea and vomiting that does not get better.     No heavy lifting, pushing, pulling or straining for 4 weeks.     Ok to shower after 24 hours. No baths or swimming for 2 weeks.     Post op visit in about 2 weeks.     ANESTHESIA  -For the first 24 hours after surgery:  Do not drive, use heavy equipment, make important decisions, or drink alcohol  -It is normal to feel sleepy for several hours.  Rest until you are more awake.  -Have someone stay with you, if needed.  They can watch for problems and help keep you safe.  -Some possible post anesthesia side effects include: nausea and vomiting, sore throat and hoarseness, sleepiness, and dizziness.    PAIN  -If you have pain after surgery, pain medicine will help you feel better.  Take it as directed, before pain becomes severe.  Most pain relievers taken by mouth need at least 20-30 minutes to start working.  -Do not drive or drink alcohol while taking pain medicine.  -Pain medication can upset your stomach.  Taking them with a little food may help.  -Other ways to help control pain: elevation, ice, and relaxation  -Call your surgeon if still having unmanageable pain an hour after taking pain medicine.  -Pain medicine can cause constipation.  Taking an over-the counter stool softener while on prescription pain medicine and drinking plenty of fluids can prevent this side effect.  -Call your surgeon if you have severe side effects like: breathing problems, trouble waking up, dizziness, confusion, or severe  constipation.    NAUSEA  -Some people have nausea after surgery.  This is often because of anesthesia, pain, pain medicine, or the stress of surgery.  -Do not push yourself to eat.  Start off with clear liquids and soup.  Slowly move to solid foods.  Don't eat fatty, rich, spicy foods at first.  Eat smaller amounts.  -If you develop persistent nausea and vomiting please notify your surgeon immediately.    BLEEDING  -Different types of surgery require different types of care and dressing changes.  It is important to follow all instructions and advice from your surgeon.  Change dressing as directed.  Call your surgeon for any concerns regarding postop bleeding.    SIGNS OF INFECTION  -Signs of infection include: fever, swelling, drainage, and redness  -Notify your surgeon if you have a fever of 100.4 F (38.0 C) or higher.  -Notify your surgeon if you notice redness, swelling, increased pain, pus, or a foul smell at the incision site.

## 2025-07-21 NOTE — ANESTHESIA PREPROCEDURE EVALUATION
07/21/2025  Blanca Acosta is a 29 y.o., female ROBOTIC CHOLECYSTECTOMY (N/A)    History reviewed. No pertinent past medical history.  History reviewed. No pertinent surgical history.    Pre-op Assessment       I have reviewed the Medications.     Review of Systems  Anesthesia Hx:             Denies Family Hx of Anesthesia complications.     Hepatic/GI:      Liver Disease,                   Physical Exam  General: Well nourished    Airway:  Mallampati: II   TM Distance: Normal  Neck ROM: Normal ROM    Dental:  Intact    Chest/Lungs:  Clear to auscultation    Heart:  Rate: Normal  Rhythm: Regular Rhythm        Anesthesia Plan  Type of Anesthesia, risks & benefits discussed:    Anesthesia Type: Gen ETT  Intra-op Monitoring Plan: Standard ASA Monitors  Post Op Pain Control Plan: multimodal analgesia  Induction:  IV  Informed Consent: Informed consent signed with the Patient and all parties understand the risks and agree with anesthesia plan.  All questions answered.   ASA Score: 2    Ready For Surgery From Anesthesia Perspective.     .

## 2025-07-21 NOTE — DISCHARGE SUMMARY
[facility] - Surgery (Hospital)  Discharge Note  Short Stay    Procedure(s) (LRB):  ROBOTIC CHOLECYSTECTOMY (N/A)      OUTCOME: Patient tolerated treatment/procedure well without complication and is now ready for discharge.    DISPOSITION: Home or Self Care    FINAL DIAGNOSIS:  <principal problem not specified>    FOLLOWUP: In clinic    DISCHARGE INSTRUCTIONS:  No discharge procedures on file.     TIME SPENT ON DISCHARGE: 10 minutes

## 2025-07-21 NOTE — ANESTHESIA POSTPROCEDURE EVALUATION
Anesthesia Post Evaluation    Patient: Blanca Acosta    Procedure(s) Performed: Procedure(s) (LRB):  ROBOTIC CHOLECYSTECTOMY (N/A)    Final Anesthesia Type: general      Patient location during evaluation: PACU  Patient participation: Yes- Able to Participate  Level of consciousness: awake and alert  Post-procedure vital signs: reviewed and stable  Pain management: adequate  Airway patency: patent    PONV status at discharge: No PONV  Anesthetic complications: no      Cardiovascular status: blood pressure returned to baseline  Respiratory status: unassisted  Hydration status: euvolemic  Follow-up not needed.          Vitals Value Taken Time   /73 07/21/25 17:06   Temp 36.9 °C (98.4 °F) 07/21/25 16:10   Pulse 84 07/21/25 17:10   Resp 22 07/21/25 17:09   SpO2 100 % 07/21/25 17:10   Vitals shown include unfiled device data.      No case tracking events are documented in the log.      Pain/Brittney Score: Pain Rating Prior to Med Admin: 6 (7/21/2025  4:45 PM)  Brittney Score: 9 (7/21/2025  5:00 PM)

## 2025-07-21 NOTE — H&P
I have seen and examined the patient. I confirmed there were no significant changes to the medical history or condition. We affirmed / executed a consent for the procedure.          Rafiq Kan MD, FACS  General and Endocrine Surgery       [facility] History & Physical         Name: Blanca Acosta   : 1995   MRN: [id number]      History of Present Illness       Chief Compliant:  Gallstone pancreatitis     HPI:  Blanca Acosta is a 29 y.o. female who presents to General surgery Clinic and follow up from an admission for gallstone pancreatitis.  Her symptoms and labs rapidly improved during her hospitalization and she desired a outpatient evaluation of her gallbladder.  She has so far been symptom-free and in her normal state of health.  Looking back, she believes some episodes of back pain were actually manifestations of biliary colic/pancreatitis type symptoms.  No previous abdominal surgeries.      No past medical history on file.  No past surgical history on file.  No family history on file.  Patient has no known allergies.  Social Drivers of Health     Tobacco Use: Low Risk  (2025)    Received from [facility]    Patient History     Smoking Tobacco Use: Never     Smokeless Tobacco Use: Never     Passive Exposure: Not on file   Alcohol Use: Not At Risk (2025)    AUDIT-C     Frequency of Alcohol Consumption: Monthly or less     Average Number of Drinks: 1 or 2     Frequency of Binge Drinking: Never   Financial Resource Strain: Low Risk  (2025)    Overall Financial Resource Strain (CARDIA)     Difficulty of Paying Living Expenses: Not very hard   Food Insecurity: No Food Insecurity (2025)    Hunger Vital Sign     Worried About Running Out of Food in the Last Year: Never true     Ran Out of Food in the Last Year: Never true   Transportation Needs: No Transportation Needs (2025)    PRAPARE - Transportation     Lack of Transportation (Medical): No     Lack of  "Transportation (Non-Medical): No   Physical Activity: Inactive (6/22/2025)    Exercise Vital Sign     Days of Exercise per Week: 0 days     Minutes of Exercise per Session: 0 min   Stress: No Stress Concern Present (6/22/2025)    [employer] [place] [employer] - Occupational Stress Questionnaire     Feeling of Stress : Only a little   Housing Stability: Low Risk  (6/22/2025)    Housing Stability Vital Sign     Unable to Pay for Housing in the Last Year: No     Number of Times Moved in the Last Year: 0     Homeless in the Last Year: No   Depression: Not at risk (6/17/2025)    Received from [facility]    PHQ-2     Patient Health Questionnaire-2 Score: 0   Utilities: Not At Risk (6/22/2025)    [facility] Utilities     Threatened with loss of utilities: No   Health Literacy: Adequate Health Literacy (6/22/2025)     Health Literacy     Frequency of need for help with medical instructions: Rarely   Social Isolation: Socially Integrated (6/22/2025)    Social Isolation     Social Isolation: 1        Home Medications:   Prior to Admission medications    Medication Sig Start Date End Date Taking? Authorizing Provider   ESTARYLLA 0.25-0.035 mg per tablet Take 1 tablet by mouth once daily.   Yes Provider, Historical       Review of Systems: Complete review of systems elicited and pertinent information is in the HPI    Physical Exam:     Vitals:  Vitals:    07/21/25 1307 07/21/25 1309   BP: 134/87 134/87   Pulse: 77    Resp: 16    Temp: 99.1 °F (37.3 °C)    TempSrc: Skin    SpO2: 99%    Weight: 89.8 kg (198 lb)    Height: 5' 2" (1.575 m)            General Appearance:  Alert, cooperative, no distress, appears stated age   Head:  Normocephalic, without obvious abnormality, atraumatic   Eyes:  No scleral icterus   Nose: Nares normal, septum midline   Throat: Lips, mucosa, and tongue normal   Neck: Supple, symmetrical, trachea midline, speaks with normal voice   Lungs:   respirations unlabored   Heart:  Regular rate  "   Abdomen:   Soft, non-tender, non-distended   Genitalia:  Deferred   Rectal:  Deferred    Extremities: Extremities normal, atraumatic, no cyanosis or edema   Pulses: 2+ and symmetric   Skin: No jaundice   Neurologic: No focal deficits        Results Reviewed:     Labs and imaging reviewed and interpreted by me, as refected in HPI and A&P.  Imaging reviewed, MRCP showing no obvious obstructions but some mild pancreatitis    Assessment and plan:     This is a 29-year-old woman with a history of gallstone pancreatitis and cholelithiasis.  We discussed the options and those include gallbladder surgery versus observation.  I believe that observation carries substantial risk of recurrent and potentially worsened manifestations of gallstone disease, including pancreatitis or cholecystitis.  My recommendation is minimally invasive gallbladder surgery.  We discussed the technical and convalescent details of robot assisted laparoscopic cholecystectomy.  We reviewed all the risks benefits.  As a guide to that discussion, I used the patient education materials from the [employer]. I reviewed that entire document all of the risks listed therein. I provided a copy to the patient and affixed a copy to the consent.  We also discussed possibility of a subtotal cholecystectomy and I completely reviewed that operation and the additional procedures that could be needed if such an operation were necessary. The patient consented in writing to proceed.      History, physical exam, laboratory, and radiographic findings were reviewed.        Rafiq Kan MD, FACS  General and Endocrine Surgery         8:50 AM, 7/21/2025

## 2025-07-24 LAB
ESTROGEN SERPL-MCNC: NORMAL PG/ML
INSULIN SERPL-ACNC: NORMAL U[IU]/ML
LAB AP CLINICAL INFORMATION: NORMAL
LAB AP GROSS DESCRIPTION: NORMAL
LAB AP PERFORMING LOCATION(S): NORMAL
LAB AP REPORT FOOTNOTES: NORMAL
T3RU NFR SERPL: NORMAL %

## 2025-07-28 ENCOUNTER — PATIENT MESSAGE (OUTPATIENT)
Dept: SURGERY | Facility: CLINIC | Age: 30
End: 2025-07-28
Payer: COMMERCIAL

## 2025-08-12 ENCOUNTER — OFFICE VISIT (OUTPATIENT)
Dept: SURGERY | Facility: CLINIC | Age: 30
End: 2025-08-12
Payer: COMMERCIAL

## 2025-08-12 VITALS
DIASTOLIC BLOOD PRESSURE: 89 MMHG | WEIGHT: 198 LBS | SYSTOLIC BLOOD PRESSURE: 126 MMHG | HEART RATE: 90 BPM | TEMPERATURE: 98 F | HEIGHT: 62 IN | BODY MASS INDEX: 36.44 KG/M2

## 2025-08-12 DIAGNOSIS — K85.10 ACUTE BILIARY PANCREATITIS WITHOUT INFECTION OR NECROSIS: Primary | ICD-10-CM

## 2025-08-12 PROCEDURE — 3074F SYST BP LT 130 MM HG: CPT | Mod: CPTII,S$GLB,, | Performed by: STUDENT IN AN ORGANIZED HEALTH CARE EDUCATION/TRAINING PROGRAM

## 2025-08-12 PROCEDURE — 3079F DIAST BP 80-89 MM HG: CPT | Mod: CPTII,S$GLB,, | Performed by: STUDENT IN AN ORGANIZED HEALTH CARE EDUCATION/TRAINING PROGRAM

## 2025-08-12 PROCEDURE — 99999 PR PBB SHADOW E&M-EST. PATIENT-LVL III: CPT | Mod: PBBFAC,,, | Performed by: STUDENT IN AN ORGANIZED HEALTH CARE EDUCATION/TRAINING PROGRAM

## 2025-08-12 PROCEDURE — 99024 POSTOP FOLLOW-UP VISIT: CPT | Mod: S$GLB,,, | Performed by: STUDENT IN AN ORGANIZED HEALTH CARE EDUCATION/TRAINING PROGRAM

## 2025-08-12 PROCEDURE — 3044F HG A1C LEVEL LT 7.0%: CPT | Mod: CPTII,S$GLB,, | Performed by: STUDENT IN AN ORGANIZED HEALTH CARE EDUCATION/TRAINING PROGRAM

## 2025-08-12 PROCEDURE — 1159F MED LIST DOCD IN RCRD: CPT | Mod: CPTII,S$GLB,, | Performed by: STUDENT IN AN ORGANIZED HEALTH CARE EDUCATION/TRAINING PROGRAM

## (undated) DEVICE — PAD PINK TRENDELENBURG POS XL

## (undated) DEVICE — GLOVE SURG BIOGEL LATEX SZ 7.5

## (undated) DEVICE — Device

## (undated) DEVICE — SYS SEE SHARP SCP ANTIFG LNG

## (undated) DEVICE — OBTURATOR BLADELESS 8MM XI CLR

## (undated) DEVICE — SUT MCRYL PLUS 4-0 PS2 27IN

## (undated) DEVICE — DRAPE UTILITY STRL 15X26IN

## (undated) DEVICE — SYR 10CC LUER LOCK

## (undated) DEVICE — SUT VICRYL+ 27 UR-6 VIOL

## (undated) DEVICE — IRRIGATOR ENDOWRIST XI SUCTION

## (undated) DEVICE — CLIP HEMO-LOK MLX LARGE LF

## (undated) DEVICE — DRAPE ARM DAVINCI XI

## (undated) DEVICE — COVER LIGHT HANDLE 80/CA

## (undated) DEVICE — TROCAR ENDOPATH XCEL 5X100MM

## (undated) DEVICE — ELECTRODE REM PLYHSV RETURN 9

## (undated) DEVICE — NDL HYPO REG 25G X 1 1/2

## (undated) DEVICE — SET TUBE DAVINCI 5 HI FLOW INS

## (undated) DEVICE — COVER TIP CURVED SCISSORS XI

## (undated) DEVICE — BAG TISSUE RETRIEVAL 5MM

## (undated) DEVICE — SEAL CANN UNIVERSAL 5-12MM